# Patient Record
Sex: FEMALE | Race: WHITE | NOT HISPANIC OR LATINO
[De-identification: names, ages, dates, MRNs, and addresses within clinical notes are randomized per-mention and may not be internally consistent; named-entity substitution may affect disease eponyms.]

---

## 2019-01-04 ENCOUNTER — RECORD ABSTRACTING (OUTPATIENT)
Age: 64
End: 2019-01-04

## 2019-01-04 DIAGNOSIS — Z86.39 PERSONAL HISTORY OF OTHER ENDOCRINE, NUTRITIONAL AND METABOLIC DISEASE: ICD-10-CM

## 2019-01-04 DIAGNOSIS — I10 ESSENTIAL (PRIMARY) HYPERTENSION: ICD-10-CM

## 2019-01-04 DIAGNOSIS — Z87.19 PERSONAL HISTORY OF OTHER DISEASES OF THE DIGESTIVE SYSTEM: ICD-10-CM

## 2019-01-04 PROBLEM — Z00.00 ENCOUNTER FOR PREVENTIVE HEALTH EXAMINATION: Status: ACTIVE | Noted: 2019-01-04

## 2019-01-04 RX ORDER — PANTOPRAZOLE 40 MG/1
40 TABLET, DELAYED RELEASE ORAL TWICE DAILY
Refills: 0 | Status: ACTIVE | COMMUNITY

## 2019-01-30 ENCOUNTER — APPOINTMENT (OUTPATIENT)
Dept: INTERNAL MEDICINE | Facility: CLINIC | Age: 64
End: 2019-01-30

## 2019-02-26 ENCOUNTER — APPOINTMENT (OUTPATIENT)
Dept: ENDOCRINOLOGY | Facility: CLINIC | Age: 64
End: 2019-02-26
Payer: MEDICARE

## 2019-02-26 VITALS
OXYGEN SATURATION: 95 % | BODY MASS INDEX: 26.6 KG/M2 | HEIGHT: 63.5 IN | DIASTOLIC BLOOD PRESSURE: 80 MMHG | SYSTOLIC BLOOD PRESSURE: 120 MMHG | WEIGHT: 152 LBS | RESPIRATION RATE: 16 BRPM | HEART RATE: 81 BPM

## 2019-02-26 DIAGNOSIS — Z60.2 PROBLEMS RELATED TO LIVING ALONE: ICD-10-CM

## 2019-02-26 DIAGNOSIS — Z80.3 FAMILY HISTORY OF MALIGNANT NEOPLASM OF BREAST: ICD-10-CM

## 2019-02-26 DIAGNOSIS — Z80.1 FAMILY HISTORY OF MALIGNANT NEOPLASM OF TRACHEA, BRONCHUS AND LUNG: ICD-10-CM

## 2019-02-26 DIAGNOSIS — Z83.3 FAMILY HISTORY OF DIABETES MELLITUS: ICD-10-CM

## 2019-02-26 LAB — GLUCOSE BLDC GLUCOMTR-MCNC: 108

## 2019-02-26 PROCEDURE — 82962 GLUCOSE BLOOD TEST: CPT

## 2019-02-26 PROCEDURE — 99214 OFFICE O/P EST MOD 30 MIN: CPT | Mod: 25

## 2019-02-26 SDOH — SOCIAL STABILITY - SOCIAL INSECURITY: PROBLEMS RELATED TO LIVING ALONE: Z60.2

## 2019-02-26 NOTE — ASSESSMENT
[Carbohydrate Consistent Diet] : carbohydrate consistent diet [Hypoglycemia Management] : hypoglycemia management [Diabetes Foot Care] : diabetes foot care [Long Term Vascular Complications] : long term vascular complications of diabetes [Action and use of Insulin] : action and use of short and long-acting insulin [Self Monitoring of Blood Glucose] : self monitoring of blood glucose [Insulin Self-Administration] : insulin self-administration [FreeTextEntry1] : - pt declined retrying metformin at this time b/o fear GI problems\par - might retry tresiba once stomach issues resolve\par - toujeo 70 un hs, trulicity 1.5 qw, farxiga 10mg qd\par - acarbose 50mg with dinner\par - options for insulin pump/ sensor reviewed. different pumps discussed. pt will consider\par - obtain path report from Dr. Jalloh\par - synthroid  112 mcg \par RTC  3 mos, labs prior\par \par

## 2019-02-26 NOTE — HISTORY OF PRESENT ILLNESS
[FreeTextEntry1] : 64 yo female f/u  for management of  DM2 and thyroid nodules.   \par \par s/p total tx (9/25/18). \par feels well. lost 3 lbs\par on  toujeo 74 un hs, trulicity 1.5 qw, farxiga 10mg qd, synthroid 112mcg\par - stopped fiasp b/o "stomach aches"\par \par TSH- 1.3\par a1c- 6.4 <-- 6.6 <-- 8.6 <-- 8.2\par f/amine-265 <--  258\par g/queenie- 1.2\par \par - ca/PTH/D- nl\par \par reports FBS- 120's, p/D- 164-205.no more nocturnal hypo's\par \par FNA (7/20/18)- of LLP 2.6- benign follic nodule (Ramonita II)\par Thyr US (7/11/18)- multiple b/l nodules,incl dominant RMP 2.4, LMP 2.6 + 2.1, LLP 2.6 All are stable\par \par 1mg ONDST- 1.6\par TSH- 0.19, FT4-1.5\par neg tpo/tg ab\par saw Dr Jalloh- s/p benign of Lt and isthmic nodules (no report is avail). \par \par *** stopped cycloset  b/o  stomach cramps, HA, fatigue \par \par Previously intolerant of metformin. \par failed lantus, toujeo, basaglar. tried tresiba\par \par HPI:\par Has been diagnosed with Hashimoto's thyroiditis and subclinical hyperthyroidism in 1998. Has been followed by Janet Hamm and Kandi, but was never placed on antithyroidal medications. \par \par Was also diagnosed with MNG, and underwent a biopsy of her dominant right thyroid nodule by Dr Jalloh, which was benign. \par No history of neck surgery or radiation exposure to the neck area other than radiologic studies.  Family history is negative for thyroid illness. \par Currently, there are no local neck symptoms of anterior neck pain or problems with breathing, speaking, or swallowing.  Patient denies symptoms of hypothyroidism or hyperthyroidism.\par \par Also, with DM2 since 2003. Denies any complications\par recalls most recent a1c in the 8's range\par Lab review: \par \par TSH- 0.25 <-- 0.083\par \par FT4- 1.15\par \par neg tpo/tg antibodies\par \par \par Thyroid US (4/10/18)- large thyroid. Multiple bilateral nodules, including dominant RMP iso complex 2.5x1.5x3.2; LLP hypo 2.6x2.1x2.3; LMP isosolid 2.4x1.1x.2.1, and isthmic solid iso 1.1x0.6x1.2\par \par 24h I-123 thyroid uptake and scan (4/25/18)- "cold nodules in the LLP and thyroid isthmus". Uptake- 15.1%\par \par FNA (11/07/17) of RMP 2.6cm nodule- colloid nodule\par \par

## 2019-02-27 ENCOUNTER — APPOINTMENT (OUTPATIENT)
Dept: INTERNAL MEDICINE | Facility: CLINIC | Age: 64
End: 2019-02-27
Payer: MEDICARE

## 2019-02-27 VITALS
BODY MASS INDEX: 26.58 KG/M2 | RESPIRATION RATE: 16 BRPM | HEIGHT: 63 IN | SYSTOLIC BLOOD PRESSURE: 120 MMHG | WEIGHT: 150 LBS | DIASTOLIC BLOOD PRESSURE: 80 MMHG | HEART RATE: 85 BPM

## 2019-02-27 PROCEDURE — 99214 OFFICE O/P EST MOD 30 MIN: CPT

## 2019-02-27 RX ORDER — PROPRANOLOL HYDROCHLORIDE 120 MG/1
120 CAPSULE, EXTENDED RELEASE ORAL
Refills: 0 | Status: ACTIVE | COMMUNITY

## 2019-02-27 NOTE — PHYSICAL EXAM
[General Appearance - Alert] : alert [General Appearance - In No Acute Distress] : in no acute distress [Sclera] : the sclera and conjunctiva were normal [PERRL With Normal Accommodation] : pupils were equal in size, round, and reactive to light [Extraocular Movements] : extraocular movements were intact [Outer Ear] : the ears and nose were normal in appearance [Oropharynx] : the oropharynx was normal [Neck Appearance] : the appearance of the neck was normal [Neck Cervical Mass (___cm)] : no neck mass was observed [Jugular Venous Distention Increased] : there was no jugular-venous distention [Thyroid Diffuse Enlargement] : the thyroid was not enlarged [Thyroid Nodule] : there were no palpable thyroid nodules [Auscultation Breath Sounds / Voice Sounds] : lungs were clear to auscultation bilaterally [Heart Rate And Rhythm] : heart rate was normal and rhythm regular [Heart Sounds] : normal S1 and S2 [Heart Sounds Gallop] : no gallops [Murmurs] : no murmurs [Heart Sounds Pericardial Friction Rub] : no pericardial rub [Breast Appearance] : normal in appearance [Breast Palpation Mass] : no palpable masses [Bowel Sounds] : normal bowel sounds [Abdomen Soft] : soft [Abdomen Tenderness] : non-tender [Abdomen Mass (___ Cm)] : no abdominal mass palpated [Cervical Lymph Nodes Enlarged Posterior Bilaterally] : posterior cervical [Cervical Lymph Nodes Enlarged Anterior Bilaterally] : anterior cervical [Supraclavicular Lymph Nodes Enlarged Bilaterally] : supraclavicular [Axillary Lymph Nodes Enlarged Bilaterally] : axillary [Femoral Lymph Nodes Enlarged Bilaterally] : femoral [Inguinal Lymph Nodes Enlarged Bilaterally] : inguinal [No CVA Tenderness] : no ~M costovertebral angle tenderness [No Spinal Tenderness] : no spinal tenderness [Abnormal Walk] : normal gait [Nail Clubbing] : no clubbing  or cyanosis of the fingernails [Musculoskeletal - Swelling] : no joint swelling seen [Motor Tone] : muscle strength and tone were normal [Skin Color & Pigmentation] : normal skin color and pigmentation [Skin Turgor] : normal skin turgor [] : no rash [Oriented To Time, Place, And Person] : oriented to person, place, and time [Impaired Insight] : insight and judgment were intact [Affect] : the affect was normal

## 2019-04-07 ENCOUNTER — APPOINTMENT (OUTPATIENT)
Dept: INTERNAL MEDICINE | Facility: AMBULATORY MEDICAL SERVICES | Age: 64
End: 2019-04-07

## 2019-04-07 ENCOUNTER — APPOINTMENT (OUTPATIENT)
Dept: INTERNAL MEDICINE | Facility: AMBULATORY MEDICAL SERVICES | Age: 64
End: 2019-04-07
Payer: MEDICARE

## 2019-04-07 PROCEDURE — 43239 EGD BIOPSY SINGLE/MULTIPLE: CPT

## 2019-04-18 DIAGNOSIS — R10.9 UNSPECIFIED ABDOMINAL PAIN: ICD-10-CM

## 2019-05-17 ENCOUNTER — APPOINTMENT (OUTPATIENT)
Dept: INTERNAL MEDICINE | Facility: AMBULATORY MEDICAL SERVICES | Age: 64
End: 2019-05-17
Payer: MEDICARE

## 2019-05-17 PROCEDURE — G0121 COLON CA SCRN NOT HI RSK IND: CPT

## 2019-05-29 ENCOUNTER — APPOINTMENT (OUTPATIENT)
Dept: ENDOCRINOLOGY | Facility: CLINIC | Age: 64
End: 2019-05-29
Payer: MEDICARE

## 2019-05-29 VITALS
RESPIRATION RATE: 16 BRPM | BODY MASS INDEX: 26.93 KG/M2 | SYSTOLIC BLOOD PRESSURE: 110 MMHG | WEIGHT: 152 LBS | HEIGHT: 63 IN | DIASTOLIC BLOOD PRESSURE: 60 MMHG | HEART RATE: 76 BPM | OXYGEN SATURATION: 98 %

## 2019-05-29 DIAGNOSIS — R14.0 ABDOMINAL DISTENSION (GASEOUS): ICD-10-CM

## 2019-05-29 LAB — GLUCOSE BLDC GLUCOMTR-MCNC: 138

## 2019-05-29 PROCEDURE — 95251 CONT GLUC MNTR ANALYSIS I&R: CPT

## 2019-05-29 PROCEDURE — 95250 CONT GLUC MNTR PHYS/QHP EQP: CPT

## 2019-05-29 PROCEDURE — 99214 OFFICE O/P EST MOD 30 MIN: CPT

## 2019-05-29 NOTE — ASSESSMENT
[Carbohydrate Consistent Diet] : carbohydrate consistent diet [Hypoglycemia Management] : hypoglycemia management [Diabetes Foot Care] : diabetes foot care [Long Term Vascular Complications] : long term vascular complications of diabetes [Action and use of Insulin] : action and use of short and long-acting insulin [Self Monitoring of Blood Glucose] : self monitoring of blood glucose [Insulin Self-Administration] : insulin self-administration [FreeTextEntry1] : - pt declined retrying metformin at this time b/o fear GI problems\par - might retry tresiba once stomach issues resolve\par - toujeo 70 un hs, trulicity 1.5 qw, farxiga 10mg qd\par - resume acarbose 50mg with dinner\par - refer for GES, and if (+), will stop trulicity. CT abdomen\par - options for insulin pump/ sensor reviewed. different pumps discussed. pt will consider\par - obtain path report from Dr. Jalloh\par - synthroid  112 mcg \par - called for recent labs from PCP\par - jenna PRO\par RTC  3 weeks

## 2019-05-29 NOTE — HISTORY OF PRESENT ILLNESS
[FreeTextEntry1] : 62 yo female f/u  for management of  DM2 and thyroid nodules.   \par \par s/p total tx (9/25/18). \par feels well. lost 3 lbs\par on  toujeo 70 un hs, trulicity 1.5 qw, farxiga 10mg qd,  acarbose 50mg with dinner (freq forgets to take it),  synthroid 112mcg\par - stopped fiasp b/o "stomach aches"\par off HCTZ b/o elevated calcium and noticed that BS is higher since then\par saw Dr. Crocker, had an EGD. still with bloating/ diarrhea. Per pt, symptoms started way prior to trulicity\par recalls 2 GES several yrs ago with equivocal results\par \par TSH- 1.3\par a1c- 6.4 <-- 6.6 <-- 8.6 <-- 8.2\par f/amine-265 <--  258\par g/queenie- 1.2\par \par - ca/PTH/D- nl\par reports FBS-  138- 202 , p/D- 150's- 160's. no more nocturnal hypo's\par \par FNA (7/20/18)- of LLP 2.6- benign follic nodule (Ramonita II)\par Thyr US (7/11/18)- multiple b/l nodules,incl dominant RMP 2.4, LMP 2.6 + 2.1, LLP 2.6.  All are stable\par \par 1mg ONDST- 1.6\par TSH- 0.19, FT4-1.5\par neg tpo/tg ab\par saw Dr Jalloh- s/p benign of Lt and isthmic nodules (no report is avail). \par \par *** stopped cycloset  b/o  stomach cramps, HA, fatigue \par \par Previously intolerant of metformin. \par failed lantus, toujeo, basaglar. tried tresiba\par \par HPI:\par Has been diagnosed with Hashimoto's thyroiditis and subclinical hyperthyroidism in 1998. Has been followed by Janet Hamm and [facility], but was never placed on antithyroidal medications. \par \par Was also diagnosed with MNG, and underwent a biopsy of her dominant right thyroid nodule by Dr Jalloh, which was benign. \par No history of neck surgery or radiation exposure to the neck area other than radiologic studies.  Family history is negative for thyroid illness. \par Currently, there are no local neck symptoms of anterior neck pain or problems with breathing, speaking, or swallowing.  Patient denies symptoms of hypothyroidism or hyperthyroidism.\par \par Also, with DM2 since 2003. Denies any complications\par recalls most recent a1c in the 8's range\par Lab review: \par \par TSH- 0.25 <-- 0.083\par \par FT4- 1.15\par \par neg tpo/tg antibodies\par \par \par Thyroid US (4/10/18)- large thyroid. Multiple bilateral nodules, including dominant RMP iso complex 2.5x1.5x3.2; LLP hypo 2.6x2.1x2.3; LMP isosolid 2.4x1.1x.2.1, and isthmic solid iso 1.1x0.6x1.2\par \par 24h I-123 thyroid uptake and scan (4/25/18)- "cold nodules in the LLP and thyroid isthmus". Uptake- 15.1%\par \par FNA (11/07/17) of RMP 2.6cm nodule- colloid nodule\par \par

## 2019-06-12 ENCOUNTER — APPOINTMENT (OUTPATIENT)
Dept: ENDOCRINOLOGY | Facility: CLINIC | Age: 64
End: 2019-06-12
Payer: MEDICARE

## 2019-06-12 VITALS
DIASTOLIC BLOOD PRESSURE: 70 MMHG | OXYGEN SATURATION: 98 % | HEIGHT: 63 IN | SYSTOLIC BLOOD PRESSURE: 135 MMHG | RESPIRATION RATE: 16 BRPM | HEART RATE: 78 BPM | BODY MASS INDEX: 27.11 KG/M2 | WEIGHT: 153 LBS

## 2019-06-12 PROCEDURE — G0108 DIAB MANAGE TRN  PER INDIV: CPT

## 2019-06-18 ENCOUNTER — APPOINTMENT (OUTPATIENT)
Dept: CT IMAGING | Facility: CLINIC | Age: 64
End: 2019-06-18

## 2019-06-24 ENCOUNTER — APPOINTMENT (OUTPATIENT)
Dept: ENDOCRINOLOGY | Facility: CLINIC | Age: 64
End: 2019-06-24

## 2019-06-27 ENCOUNTER — APPOINTMENT (OUTPATIENT)
Dept: NUCLEAR MEDICINE | Facility: HOSPITAL | Age: 64
End: 2019-06-27

## 2019-08-13 ENCOUNTER — APPOINTMENT (OUTPATIENT)
Dept: ENDOCRINOLOGY | Facility: CLINIC | Age: 64
End: 2019-08-13
Payer: MEDICARE

## 2019-08-13 VITALS
HEART RATE: 83 BPM | WEIGHT: 150 LBS | BODY MASS INDEX: 26.58 KG/M2 | OXYGEN SATURATION: 98 % | SYSTOLIC BLOOD PRESSURE: 110 MMHG | RESPIRATION RATE: 16 BRPM | DIASTOLIC BLOOD PRESSURE: 65 MMHG | HEIGHT: 63 IN

## 2019-08-13 LAB — GLUCOSE BLDC GLUCOMTR-MCNC: 143

## 2019-08-13 PROCEDURE — 82962 GLUCOSE BLOOD TEST: CPT

## 2019-08-13 PROCEDURE — 99214 OFFICE O/P EST MOD 30 MIN: CPT | Mod: 25

## 2019-08-13 NOTE — HISTORY OF PRESENT ILLNESS
[FreeTextEntry1] : 63 yo female f/u  for management of  DM2 and thyroid nodules.   \par \par *** Aug 13, 2019 ***\par \par on  toujeo 70 un hs, trulicity 1.5 qw, farxiga 10mg qd,  synthroid  112 mcg, ramipril\par off HCTZ\par stopped crestor b/o myalgia. was rx'ed zetia\par off acarbose b/o GI issues\par did not do GES yet\par had an MRI abdomen done- reports normal results\par a1c- 6.3, f/am-272\par TSH- 1.09, FT4- 1.3, LDL- 122, TG- 289\par ca- 10.5\par no log, reports FBS- low 110's, ppg- upto 149\par \par *** May 29, 2019 ***\par \par s/p total tx (9/25/18). \par feels well. lost 3 lbs\par on  toujeo 70 un hs, trulicity 1.5 qw, farxiga 10mg qd,  acarbose 50mg with dinner (freq forgets to take it),  synthroid 112mcg\par - stopped fiasp b/o "stomach aches"\par off HCTZ b/o elevated calcium and noticed that BS is higher since then\par saw Dr. Crocker, had an EGD. still with bloating/ diarrhea. Per pt, symptoms started way prior to trulicity\par recalls 2 GES several yrs ago with equivocal results\par \par TSH- 1.3\par a1c- 6.4 <-- 6.6 <-- 8.6 <-- 8.2\par f/amine-265 <--  258\par g/queenie- 1.2\par \par - ca/PTH/D- nl\par reports FBS-  138- 202 , p/D- 150's- 160's. no more nocturnal hypo's\par \par FNA (7/20/18)- of LLP 2.6- benign follic nodule (Ramonita II)\par Thyr US (7/11/18)- multiple b/l nodules,incl dominant RMP 2.4, LMP 2.6 + 2.1, LLP 2.6.  All are stable\par \par 1mg ONDST- 1.6\par TSH- 0.19, FT4-1.5\par neg tpo/tg ab\par saw Dr Jalloh- s/p benign of Lt and isthmic nodules (no report is avail). \par \par *** stopped cycloset  b/o  stomach cramps, HA, fatigue \par \par Previously intolerant of metformin. \par failed lantus, toujeo, basaglar. tried tresiba\par \par HPI:\par Has been diagnosed with Hashimoto's thyroiditis and subclinical hyperthyroidism in 1998. Has been followed by Janet Hamm and Kandi, but was never placed on antithyroidal medications. \par \par Was also diagnosed with MNG, and underwent a biopsy of her dominant right thyroid nodule by Dr Jalloh, which was benign. \par No history of neck surgery or radiation exposure to the neck area other than radiologic studies.  Family history is negative for thyroid illness. \par Currently, there are no local neck symptoms of anterior neck pain or problems with breathing, speaking, or swallowing.  Patient denies symptoms of hypothyroidism or hyperthyroidism.\par \par Also, with DM2 since 2003. Denies any complications\par recalls most recent a1c in the 8's range\par Lab review: \par \par TSH- 0.25 <-- 0.083\par \par FT4- 1.15\par \par neg tpo/tg antibodies\par \par \par Thyroid US (4/10/18)- large thyroid. Multiple bilateral nodules, including dominant RMP iso complex 2.5x1.5x3.2; LLP hypo 2.6x2.1x2.3; LMP isosolid 2.4x1.1x.2.1, and isthmic solid iso 1.1x0.6x1.2\par \par 24h I-123 thyroid uptake and scan (4/25/18)- "cold nodules in the LLP and thyroid isthmus". Uptake- 15.1%\par \par FNA (11/07/17) of RMP 2.6cm nodule- colloid nodule\par \par

## 2019-09-05 ENCOUNTER — APPOINTMENT (OUTPATIENT)
Dept: INTERNAL MEDICINE | Facility: CLINIC | Age: 64
End: 2019-09-05
Payer: MEDICARE

## 2019-09-05 VITALS
HEIGHT: 63 IN | HEART RATE: 84 BPM | WEIGHT: 150 LBS | OXYGEN SATURATION: 96 % | SYSTOLIC BLOOD PRESSURE: 150 MMHG | DIASTOLIC BLOOD PRESSURE: 61 MMHG | BODY MASS INDEX: 26.58 KG/M2

## 2019-09-05 VITALS — DIASTOLIC BLOOD PRESSURE: 82 MMHG | SYSTOLIC BLOOD PRESSURE: 132 MMHG

## 2019-09-05 PROCEDURE — 99214 OFFICE O/P EST MOD 30 MIN: CPT

## 2019-09-05 RX ORDER — SODIUM PICOSULFATE, MAGNESIUM OXIDE, AND ANHYDROUS CITRIC ACID 10; 3.5; 12 MG/160ML; G/160ML; G/160ML
10-3.5-12 MG-GM LIQUID ORAL
Qty: 1 | Refills: 0 | Status: DISCONTINUED | COMMUNITY
Start: 2019-04-18 | End: 2019-09-05

## 2019-09-05 RX ORDER — PANCRELIPASE 36000; 180000; 114000 [USP'U]/1; [USP'U]/1; [USP'U]/1
36000-114000 CAPSULE, DELAYED RELEASE PELLETS ORAL
Refills: 0 | Status: DISCONTINUED | COMMUNITY
End: 2019-09-05

## 2019-09-05 RX ORDER — ONDANSETRON 4 MG/1
4 TABLET ORAL 4 TIMES DAILY
Qty: 8 | Refills: 0 | Status: DISCONTINUED | COMMUNITY
Start: 2019-04-18 | End: 2019-09-05

## 2019-09-05 RX ORDER — ACARBOSE 50 MG/1
50 TABLET ORAL
Qty: 90 | Refills: 2 | Status: DISCONTINUED | COMMUNITY
Start: 2019-02-26 | End: 2019-09-05

## 2019-09-05 NOTE — HISTORY OF PRESENT ILLNESS
[FreeTextEntry1] : She still has diarrhea and pain in middle of abdomen this happens on/off 2-3 times a week, mucousy. She feels full quickly. Over last week pain right upper abdomen and flank. Constant. Dr.Abalev carrascoed  gastric emptying study. Diabetes is under control last A1c 6.6

## 2019-09-05 NOTE — PHYSICAL EXAM
[General Appearance - In No Acute Distress] : in no acute distress [General Appearance - Alert] : alert [Sclera] : the sclera and conjunctiva were normal [PERRL With Normal Accommodation] : pupils were equal in size, round, and reactive to light [Outer Ear] : the ears and nose were normal in appearance [Extraocular Movements] : extraocular movements were intact [Oropharynx] : the oropharynx was normal [Neck Cervical Mass (___cm)] : no neck mass was observed [Neck Appearance] : the appearance of the neck was normal [Jugular Venous Distention Increased] : there was no jugular-venous distention [Thyroid Diffuse Enlargement] : the thyroid was not enlarged [Thyroid Nodule] : there were no palpable thyroid nodules [Heart Rate And Rhythm] : heart rate was normal and rhythm regular [Auscultation Breath Sounds / Voice Sounds] : lungs were clear to auscultation bilaterally [Heart Sounds] : normal S1 and S2 [Murmurs] : no murmurs [Heart Sounds Gallop] : no gallops [Full Pulse] : the pedal pulses are present [Heart Sounds Pericardial Friction Rub] : no pericardial rub [Edema] : there was no peripheral edema [Bowel Sounds] : normal bowel sounds [Abdomen Soft] : soft [Abdomen Tenderness] : non-tender [Abdomen Mass (___ Cm)] : no abdominal mass palpated [Cervical Lymph Nodes Enlarged Posterior Bilaterally] : posterior cervical [Cervical Lymph Nodes Enlarged Anterior Bilaterally] : anterior cervical [Axillary Lymph Nodes Enlarged Bilaterally] : axillary [Femoral Lymph Nodes Enlarged Bilaterally] : femoral [Supraclavicular Lymph Nodes Enlarged Bilaterally] : supraclavicular [Inguinal Lymph Nodes Enlarged Bilaterally] : inguinal [No CVA Tenderness] : no ~M costovertebral angle tenderness [No Spinal Tenderness] : no spinal tenderness [Nail Clubbing] : no clubbing  or cyanosis of the fingernails [Abnormal Walk] : normal gait [Musculoskeletal - Swelling] : no joint swelling seen [Motor Tone] : muscle strength and tone were normal [Skin Color & Pigmentation] : normal skin color and pigmentation [Skin Turgor] : normal skin turgor [] : no rash [Sensation] : the sensory exam was normal to light touch and pinprick [Deep Tendon Reflexes (DTR)] : deep tendon reflexes were 2+ and symmetric [No Focal Deficits] : no focal deficits [Oriented To Time, Place, And Person] : oriented to person, place, and time [Affect] : the affect was normal [Impaired Insight] : insight and judgment were intact

## 2019-09-17 RX ORDER — PEN NEEDLE, DIABETIC 29 G X1/2"
32G X 4 MM NEEDLE, DISPOSABLE MISCELLANEOUS
Qty: 1 | Refills: 2 | Status: ACTIVE | COMMUNITY
Start: 2019-09-17 | End: 1900-01-01

## 2019-09-24 ENCOUNTER — APPOINTMENT (OUTPATIENT)
Dept: INTERNAL MEDICINE | Facility: CLINIC | Age: 64
End: 2019-09-24

## 2019-10-02 PROBLEM — Z60.2 PERSON LIVING ALONE: Status: ACTIVE | Noted: 2019-02-26

## 2019-11-13 ENCOUNTER — TRANSCRIPTION ENCOUNTER (OUTPATIENT)
Age: 64
End: 2019-11-13

## 2019-11-13 ENCOUNTER — APPOINTMENT (OUTPATIENT)
Dept: ENDOCRINOLOGY | Facility: CLINIC | Age: 64
End: 2019-11-13
Payer: MEDICARE

## 2019-11-13 VITALS — TEMPERATURE: 98.6 F | BODY MASS INDEX: 26.4 KG/M2 | HEIGHT: 63 IN | WEIGHT: 149 LBS

## 2019-11-13 LAB
CHOLEST SERPL-MCNC: 184 MG/DL
CHOLEST/HDLC SERPL: 4.8 RATIO
GLUCOSE BLDC GLUCOMTR-MCNC: 124
HDLC SERPL-MCNC: 38 MG/DL
LDLC SERPL CALC-MCNC: 112 MG/DL
TRIGL SERPL-MCNC: 170 MG/DL

## 2019-11-13 PROCEDURE — 36415 COLL VENOUS BLD VENIPUNCTURE: CPT

## 2019-11-13 PROCEDURE — 82962 GLUCOSE BLOOD TEST: CPT

## 2019-11-13 PROCEDURE — 99214 OFFICE O/P EST MOD 30 MIN: CPT | Mod: 25

## 2019-11-13 NOTE — HISTORY OF PRESENT ILLNESS
[FreeTextEntry1] : 65 yo female f/u  for management of  DM2 and thyroid nodules.   \par \par *** Nov 13, 2019 ***\par recovering from cold. still on levaquin\par on toujeo 70 un hs, trulicity 1.5 qw, farxiga 10mg qd, synthroid 112 mcg, crestor 5mg qw (feels fine on this dose) \par not taking zetia\par no GES yet\par a1c- 6.7, iCa- 5.6, TSH- 1.9, ca- 10.0\par no Lipid panel done\par \par *** Aug 13, 2019 ***\par \par on  toujeo 70 un hs, trulicity 1.5 qw, farxiga 10mg qd,  synthroid  112 mcg, ramipril\par off HCTZ\par stopped crestor b/o myalgia. was rx'ed zetia\par off acarbose b/o GI issues\par did not do GES yet\par had an MRI abdomen done- reports normal results\par a1c- 6.3, f/am-272\par TSH- 1.09, FT4- 1.3, LDL- 122, TG- 289\par ca- 10.5\par no log, reports FBS- low 110's, ppg- upto 149\par \par *** May 29, 2019 ***\par \par s/p total tx (9/25/18). \par feels well. lost 3 lbs\par on  toujeo 70 un hs, trulicity 1.5 qw, farxiga 10mg qd,  acarbose 50mg with dinner (freq forgets to take it),  synthroid 112mcg\par - stopped fiasp b/o "stomach aches"\par off HCTZ b/o elevated calcium and noticed that BS is higher since then\par saw Dr. Crocker, had an EGD. still with bloating/ diarrhea. Per pt, symptoms started way prior to trulicity\par recalls 2 GES several yrs ago with equivocal results\par \par TSH- 1.3\par a1c- 6.4 <-- 6.6 <-- 8.6 <-- 8.2\par f/amine-265 <--  258\par g/queenie- 1.2\par \par - ca/PTH/D- nl\par reports FBS-  138- 202 , p/D- 150's- 160's. no more nocturnal hypo's\par \par FNA (7/20/18)- of LLP 2.6- benign follic nodule (Ramonita II)\par Thyr US (7/11/18)- multiple b/l nodules,incl dominant RMP 2.4, LMP 2.6 + 2.1, LLP 2.6.  All are stable\par \par 1mg ONDST- 1.6\par TSH- 0.19, FT4-1.5\par neg tpo/tg ab\par saw Dr Jalloh- s/p benign of Lt and isthmic nodules (no report is avail). \par \par *** stopped cycloset  b/o  stomach cramps, HA, fatigue \par \par Previously intolerant of metformin. \par failed lantus, toujeo, basaglar. tried tresiba\par \par HPI:\par Has been diagnosed with Hashimoto's thyroiditis and subclinical hyperthyroidism in 1998. Has been followed by Drs Noris and Kandi, but was never placed on antithyroidal medications. \par \par Was also diagnosed with MNG, and underwent a biopsy of her dominant right thyroid nodule by Dr Jalloh, which was benign. \par No history of neck surgery or radiation exposure to the neck area other than radiologic studies.  Family history is negative for thyroid illness. \par Currently, there are no local neck symptoms of anterior neck pain or problems with breathing, speaking, or swallowing.  Patient denies symptoms of hypothyroidism or hyperthyroidism.\par \par Also, with DM2 since 2003. Denies any complications\par recalls most recent a1c in the 8's range\par Lab review: \par \par TSH- 0.25 <-- 0.083\par \par FT4- 1.15\par \par neg tpo/tg antibodies\par \par \par Thyroid US (4/10/18)- large thyroid. Multiple bilateral nodules, including dominant RMP iso complex 2.5x1.5x3.2; LLP hypo 2.6x2.1x2.3; LMP isosolid 2.4x1.1x.2.1, and isthmic solid iso 1.1x0.6x1.2\par \par 24h I-123 thyroid uptake and scan (4/25/18)- "cold nodules in the LLP and thyroid isthmus". Uptake- 15.1%\par \par FNA (11/07/17) of RMP 2.6cm nodule- colloid nodule\par \par

## 2019-12-12 ENCOUNTER — APPOINTMENT (OUTPATIENT)
Dept: INTERNAL MEDICINE | Facility: CLINIC | Age: 64
End: 2019-12-12

## 2019-12-18 ENCOUNTER — APPOINTMENT (OUTPATIENT)
Dept: INTERNAL MEDICINE | Facility: CLINIC | Age: 64
End: 2019-12-18

## 2020-02-11 ENCOUNTER — LABORATORY RESULT (OUTPATIENT)
Age: 65
End: 2020-02-11

## 2020-02-11 ENCOUNTER — APPOINTMENT (OUTPATIENT)
Dept: ENDOCRINOLOGY | Facility: CLINIC | Age: 65
End: 2020-02-11
Payer: MEDICARE

## 2020-02-11 VITALS
BODY MASS INDEX: 27.82 KG/M2 | DIASTOLIC BLOOD PRESSURE: 60 MMHG | HEIGHT: 63 IN | OXYGEN SATURATION: 96 % | SYSTOLIC BLOOD PRESSURE: 110 MMHG | RESPIRATION RATE: 17 BRPM | WEIGHT: 157 LBS | HEART RATE: 87 BPM

## 2020-02-11 LAB — GLUCOSE BLDC GLUCOMTR-MCNC: 188

## 2020-02-11 PROCEDURE — 95250 CONT GLUC MNTR PHYS/QHP EQP: CPT

## 2020-02-11 PROCEDURE — 36415 COLL VENOUS BLD VENIPUNCTURE: CPT

## 2020-02-11 PROCEDURE — 95251 CONT GLUC MNTR ANALYSIS I&R: CPT

## 2020-02-11 PROCEDURE — 99214 OFFICE O/P EST MOD 30 MIN: CPT | Mod: 25

## 2020-02-11 NOTE — ASSESSMENT
[Carbohydrate Consistent Diet] : carbohydrate consistent diet [Diabetes Foot Care] : diabetes foot care [Hypoglycemia Management] : hypoglycemia management [Long Term Vascular Complications] : long term vascular complications of diabetes [Action and use of Insulin] : action and use of short and long-acting insulin [Self Monitoring of Blood Glucose] : self monitoring of blood glucose [Insulin Self-Administration] : insulin self-administration [FreeTextEntry1] : - pt declined retrying metformin at this time b/o fear GI problems\par - might retry tresiba once stomach issues resolve\par - a1c is a bit lower according to the reported FS- suspect some hypos\par - Naomie PRO\par - toujeo 70 un hs, trulicity 1.5 qw, farxiga 10mg qd- will adjust post sensor download\par - keep off acarbose\par - refer for GES, and if (+), will stop trulicity. obtain the report of abd MRI\par - options for insulin pump/ sensor reviewed. different pumps discussed. pt will consider\par - obtain path report from Dr. Jalloh\par - synthroid  112 mcg \par - monitor ca/PTH\par - rpt fasting lipid panel- with direct LDL- can ry crestor 5mg biw, and if tolerates will slowly uptitrate. might require combo with zetia +/- adding fenofibrate\par - f/u pulmonary (Dr. Lang)\par RTC  3 mos

## 2020-02-11 NOTE — HISTORY OF PRESENT ILLNESS
[FreeTextEntry1] : 65 yo female f/u  for management of  DM2 and thyroid nodules.   \par \par *** Feb 11, 2020 ***\par \par on toujeo 70 un hs, trulicity 1.5 qw, farxiga 10mg qd\par not taking crestor b/o generalized myalgia\par TSH- 1.14\par TG-419\par a1c- 6.4\par \par no log, reports fbs- 140's, ppg- 160's\par today fbs in the office- 188\par denies hypo's\par \par \par last cardio - 2019 (Dr Stevens)\par last ST- 2019\par last echo- 2019\par last podiatry - several years ago\par last ophthalmology- 02/20\par \par *** Nov 13, 2019 ***\par recovering from cold. still on levaquin\par on toujeo 70 un hs, trulicity 1.5 qw, farxiga 10mg qd, synthroid 112 mcg, crestor 5mg qw (feels fine on this dose) \par not taking zetia\par no GES yet\par a1c- 6.7, iCa- 5.6, TSH- 1.9, ca- 10.0\par no Lipid panel done\par \par *** Aug 13, 2019 ***\par \par on  toujeo 70 un hs, trulicity 1.5 qw, farxiga 10mg qd,  synthroid  112 mcg, ramipril\par off HCTZ\par stopped crestor b/o myalgia. was rx'ed zetia\par off acarbose b/o GI issues\par did not do GES yet\par had an MRI abdomen done- reports normal results\par a1c- 6.3, f/am-272\par TSH- 1.09, FT4- 1.3, LDL- 122, TG- 289\par ca- 10.5\par no log, reports FBS- low 110's, ppg- upto 149\par \par *** May 29, 2019 ***\par \par s/p total tx (9/25/18). \par feels well. lost 3 lbs\par on  toujeo 70 un hs, trulicity 1.5 qw, farxiga 10mg qd,  acarbose 50mg with dinner (freq forgets to take it),  synthroid 112mcg\par - stopped fiasp b/o "stomach aches"\par off HCTZ b/o elevated calcium and noticed that BS is higher since then\par saw Dr. Crocker, had an EGD. still with bloating/ diarrhea. Per pt, symptoms started way prior to trulicity\par recalls 2 GES several yrs ago with equivocal results\par \par TSH- 1.3\par a1c- 6.4 <-- 6.6 <-- 8.6 <-- 8.2\par f/amine-265 <--  258\par g/queenie- 1.2\par \par - ca/PTH/D- nl\par reports FBS-  138- 202 , p/D- 150's- 160's. no more nocturnal hypo's\par \par FNA (7/20/18)- of LLP 2.6- benign follic nodule (Ramonita II)\par Thyr US (7/11/18)- multiple b/l nodules,incl dominant RMP 2.4, LMP 2.6 + 2.1, LLP 2.6.  All are stable\par \par 1mg ONDST- 1.6\par TSH- 0.19, FT4-1.5\par neg tpo/tg ab\par saw Dr Jalloh- s/p benign of Lt and isthmic nodules (no report is avail). \par \par *** stopped cycloset  b/o  stomach cramps, HA, fatigue \par \par Previously intolerant of metformin. \par failed lantus, toujeo, basaglar. tried tresiba\par \par HPI:\par Has been diagnosed with Hashimoto's thyroiditis and subclinical hyperthyroidism in 1998. Has been followed by Janet Hamm and [facility], but was never placed on antithyroidal medications. \par \par Was also diagnosed with MNG, and underwent a biopsy of her dominant right thyroid nodule by Dr Jalloh, which was benign. \par No history of neck surgery or radiation exposure to the neck area other than radiologic studies.  Family history is negative for thyroid illness. \par Currently, there are no local neck symptoms of anterior neck pain or problems with breathing, speaking, or swallowing.  Patient denies symptoms of hypothyroidism or hyperthyroidism.\par \par Also, with DM2 since 2003. Denies any complications\par recalls most recent a1c in the 8's range\par Lab review: \par \par TSH- 0.25 <-- 0.083\par \par FT4- 1.15\par \par neg tpo/tg antibodies\par \par \par Thyroid US (4/10/18)- large thyroid. Multiple bilateral nodules, including dominant RMP iso complex 2.5x1.5x3.2; LLP hypo 2.6x2.1x2.3; LMP isosolid 2.4x1.1x.2.1, and isthmic solid iso 1.1x0.6x1.2\par \par 24h I-123 thyroid uptake and scan (4/25/18)- "cold nodules in the LLP and thyroid isthmus". Uptake- 15.1%\par \par FNA (11/07/17) of RMP 2.6cm nodule- colloid nodule\par \par

## 2020-02-12 LAB
CA-I SERPL-SCNC: 1.33 MMOL/L
CALCIUM SERPL-MCNC: 10.4 MG/DL
CALCIUM SERPL-MCNC: 10.4 MG/DL
CHOLEST SERPL-MCNC: 212 MG/DL
CHOLEST/HDLC SERPL: 6.9 RATIO
HDLC SERPL-MCNC: 31 MG/DL
LDLC SERPL CALC-MCNC: NORMAL MG/DL
PARATHYROID HORMONE INTACT: 90 PG/ML
TRIGL SERPL-MCNC: 550 MG/DL

## 2020-05-18 ENCOUNTER — APPOINTMENT (OUTPATIENT)
Dept: ENDOCRINOLOGY | Facility: CLINIC | Age: 65
End: 2020-05-18
Payer: MEDICARE

## 2020-05-18 VITALS
DIASTOLIC BLOOD PRESSURE: 60 MMHG | RESPIRATION RATE: 17 BRPM | WEIGHT: 150 LBS | BODY MASS INDEX: 26.58 KG/M2 | HEART RATE: 80 BPM | SYSTOLIC BLOOD PRESSURE: 130 MMHG | HEIGHT: 63 IN | OXYGEN SATURATION: 98 %

## 2020-05-18 LAB — GLUCOSE BLDC GLUCOMTR-MCNC: 163

## 2020-05-18 PROCEDURE — 36415 COLL VENOUS BLD VENIPUNCTURE: CPT

## 2020-05-18 PROCEDURE — 99214 OFFICE O/P EST MOD 30 MIN: CPT | Mod: 25

## 2020-05-18 RX ORDER — DIPHENOXYLATE HYDROCHLORIDE AND ATROPINE SULFATE 2.5; .025 MG/1; MG/1
2.5-0.025 TABLET ORAL 4 TIMES DAILY
Refills: 0 | Status: DISCONTINUED | COMMUNITY
End: 2020-05-18

## 2020-05-18 RX ORDER — HYDROCHLOROTHIAZIDE 25 MG/1
25 TABLET ORAL
Refills: 0 | Status: DISCONTINUED | COMMUNITY
End: 2020-05-18

## 2020-05-18 NOTE — HISTORY OF PRESENT ILLNESS
[FreeTextEntry1] : 63 yo female f/u  for management of  DM2 and thyroid nodules.   \par \par *** May 18, 2020 ***\par \par diagnosed with breast cancer. S/p lumpectomy. planned for XRT and hormonal therapy\par On toujeo 70 un hs, trulicity 1.5 qw, farxiga 10mg qd, fenofibrate  134 mg, Synthroid 112 mcg\par not taking crestor\par reports fbs- 120's- 130's, ppg- 160's\par \par *** Feb 11, 2020 ***\par \par on toujeo 70 un hs, trulicity 1.5 qw, farxiga 10mg qd\par not taking crestor b/o generalized myalgia\par TSH- 1.14\par TG-419\par a1c- 6.4\par \par no log, reports fbs- 140's, ppg- 160's\par today fbs in the office- 188\par denies hypo's\par \par \par \par *** Nov 13, 2019 ***\par recovering from cold. still on levaquin\par on toujeo 70 un hs, trulicity 1.5 qw, farxiga 10mg qd, synthroid 112 mcg, crestor 5mg qw (feels fine on this dose) \par not taking zetia\par no GES yet\par a1c- 6.7, iCa- 5.6, TSH- 1.9, ca- 10.0\par no Lipid panel done\par \par *** Aug 13, 2019 ***\par \par on  toujeo 70 un hs, trulicity 1.5 qw, farxiga 10mg qd,  synthroid  112 mcg, ramipril\par off HCTZ\par stopped crestor b/o myalgia. was rx'ed zetia\par off acarbose b/o GI issues\par did not do GES yet\par had an MRI abdomen done- reports normal results\par a1c- 6.3, f/am-272\par TSH- 1.09, FT4- 1.3, LDL- 122, TG- 289\par ca- 10.5\par no log, reports FBS- low 110's, ppg- upto 149\par \par *** May 29, 2019 ***\par \par s/p total tx (9/25/18). \par feels well. lost 3 lbs\par on  toujeo 70 un hs, trulicity 1.5 qw, farxiga 10mg qd,  acarbose 50mg with dinner (freq forgets to take it),  synthroid 112mcg\par - stopped fiasp b/o "stomach aches"\par off HCTZ b/o elevated calcium and noticed that BS is higher since then\par saw Dr. Crocker, had an EGD. still with bloating/ diarrhea. Per pt, symptoms started way prior to trulicity\par recalls 2 GES several yrs ago with equivocal results\par \par TSH- 1.3\par a1c- 6.4 <-- 6.6 <-- 8.6 <-- 8.2\par f/amine-265 <--  258\par g/queenie- 1.2\par \par - ca/PTH/D- nl\par reports FBS-  138- 202 , p/D- 150's- 160's. no more nocturnal hypo's\par \par FNA (7/20/18)- of LLP 2.6- benign follic nodule (Ramonita II)\par Thyr US (7/11/18)- multiple b/l nodules,incl dominant RMP 2.4, LMP 2.6 + 2.1, LLP 2.6.  All are stable\par \par 1mg ONDST- 1.6\par TSH- 0.19, FT4-1.5\par neg tpo/tg ab\par saw Dr Jalloh- s/p benign of Lt and isthmic nodules (no report is avail). \par \par *** stopped cycloset  b/o  stomach cramps, HA, fatigue \par \par Previously intolerant of metformin. \par failed lantus, toujeo, basaglar. tried tresiba\par \par HPI:\par Has been diagnosed with Hashimoto's thyroiditis and subclinical hyperthyroidism in 1998. Has been followed by Janet Hamm and Kandi, but was never placed on antithyroidal medications. \par \par Was also diagnosed with MNG, and underwent a biopsy of her dominant right thyroid nodule by Dr Jalloh, which was benign. \par No history of neck surgery or radiation exposure to the neck area other than radiologic studies.  Family history is negative for thyroid illness. \par Currently, there are no local neck symptoms of anterior neck pain or problems with breathing, speaking, or swallowing.  Patient denies symptoms of hypothyroidism or hyperthyroidism.\par \par Also, with DM2 since 2003. Denies any complications\par recalls most recent a1c in the 8's range\par Lab review: \par \par TSH- 0.25 <-- 0.083\par \par FT4- 1.15\par \par neg tpo/tg antibodies\par \par \par Thyroid US (4/10/18)- large thyroid. Multiple bilateral nodules, including dominant RMP iso complex 2.5x1.5x3.2; LLP hypo 2.6x2.1x2.3; LMP isosolid 2.4x1.1x.2.1, and isthmic solid iso 1.1x0.6x1.2\par \par 24h I-123 thyroid uptake and scan (4/25/18)- "cold nodules in the LLP and thyroid isthmus". Uptake- 15.1%\par \par FNA (11/07/17) of RMP 2.6cm nodule- colloid nodule\par \par \par ********\par \par last cardio - 2019 (Dr Stevens)\par last ST- 2019\par last echo- 2019\par last podiatry - several years ago\par last ophthalmology- 02/20\par

## 2020-05-18 NOTE — REASON FOR VISIT
[Follow - Up] : a follow-up visit [DM Type 2] : DM Type 2 [Thyroid nodule/ MNG] : thyroid nodule/ MNG

## 2020-05-18 NOTE — ASSESSMENT
[Carbohydrate Consistent Diet] : carbohydrate consistent diet [Hypoglycemia Management] : hypoglycemia management [Diabetes Foot Care] : diabetes foot care [Long Term Vascular Complications] : long term vascular complications of diabetes [Action and use of Insulin] : action and use of short and long-acting insulin [Self Monitoring of Blood Glucose] : self monitoring of blood glucose [Insulin Self-Administration] : insulin self-administration [FreeTextEntry1] : - pt declined retrying metformin at this time b/o fear GI problems\par - might retry tresiba once stomach issues resolve\par - a1c is a bit lower according to the reported FS- suspect some hypos\par - toujeo 70 un hs, trulicity 1.5 qw, farxiga 10mg qd- will adjust post sensor download\par - keep off acarbose\par - refer for GES, and if (+), will stop trulicity. obtain the report of abd MRI\par - options for insulin pump/ sensor reviewed. different pumps discussed. pt will consider\par - obtain path report from Dr. Jalloh\par - synthroid  112 mcg \par - monitor ca/PTH\par - rpt fasting lipid panel- with direct LDL- can try crestor 5mg biw, and if tolerates will slowly uptitrate. might require combo with zetia\par - cont  fenofibrate 134 mg qd\par - f/u pulmonary (Dr. Lang)\par RTC  3 mos

## 2020-05-19 ENCOUNTER — TRANSCRIPTION ENCOUNTER (OUTPATIENT)
Age: 65
End: 2020-05-19

## 2020-05-19 LAB
25(OH)D3 SERPL-MCNC: 77.9 NG/ML
ALBUMIN SERPL ELPH-MCNC: 4.3 G/DL
ALP BLD-CCNC: 60 U/L
ALT SERPL-CCNC: 23 U/L
ANION GAP SERPL CALC-SCNC: 12 MMOL/L
AST SERPL-CCNC: 22 U/L
BILIRUB SERPL-MCNC: 0.8 MG/DL
BUN SERPL-MCNC: 18 MG/DL
CALCIUM SERPL-MCNC: 10.1 MG/DL
CHLORIDE SERPL-SCNC: 108 MMOL/L
CHOLEST SERPL-MCNC: 171 MG/DL
CHOLEST/HDLC SERPL: 6 RATIO
CO2 SERPL-SCNC: 25 MMOL/L
CREAT SERPL-MCNC: 0.57 MG/DL
ESTIMATED AVERAGE GLUCOSE: 151 MG/DL
FRUCTOSAMINE SERPL-MCNC: 220 UMOL/L
GLUCOSE SERPL-MCNC: 155 MG/DL
HBA1C MFR BLD HPLC: 6.9 %
HDLC SERPL-MCNC: 28 MG/DL
LDLC SERPL CALC-MCNC: 71 MG/DL
POTASSIUM SERPL-SCNC: 4.1 MMOL/L
PROT SERPL-MCNC: 6.3 G/DL
SODIUM SERPL-SCNC: 145 MMOL/L
T4 FREE SERPL-MCNC: 1.9 NG/DL
TRIGL SERPL-MCNC: 359 MG/DL
TSH SERPL-ACNC: 0.18 UIU/ML
VIT B12 SERPL-MCNC: 566 PG/ML

## 2020-08-18 ENCOUNTER — APPOINTMENT (OUTPATIENT)
Dept: FAMILY MEDICINE | Facility: CLINIC | Age: 65
End: 2020-08-18

## 2020-08-18 ENCOUNTER — APPOINTMENT (OUTPATIENT)
Dept: ENDOCRINOLOGY | Facility: CLINIC | Age: 65
End: 2020-08-18
Payer: MEDICARE

## 2020-08-18 VITALS
RESPIRATION RATE: 16 BRPM | SYSTOLIC BLOOD PRESSURE: 122 MMHG | HEIGHT: 63 IN | OXYGEN SATURATION: 98 % | WEIGHT: 147 LBS | DIASTOLIC BLOOD PRESSURE: 70 MMHG | HEART RATE: 80 BPM | BODY MASS INDEX: 26.05 KG/M2

## 2020-08-18 LAB — GLUCOSE BLDC GLUCOMTR-MCNC: 116

## 2020-08-18 PROCEDURE — 99215 OFFICE O/P EST HI 40 MIN: CPT

## 2020-08-18 PROCEDURE — 82962 GLUCOSE BLOOD TEST: CPT

## 2020-08-18 NOTE — HISTORY OF PRESENT ILLNESS
[FreeTextEntry1] : 63 yo female f/u  for management of  DM2 and thyroid nodules.   \par \par *** Aug 18, 2020 ***\par \par on  toujeo 74 un hs, trulicity 1.5 qw, farxiga 10mg qd, fenofibrate 200mg, synthroid 100 mcg\par feels more tired, poss related to the radiation tx.\par hot flashes on letrozole\par reports fbs- 142-162, not checking ppg\par she stopped white bread and fruits within past 2 weeks- FS are much better\par today fbs- 116\par labs from 8/14/20- a1c- 7.3, TG- 196, LDL- 79, TSH- 2.08\par WBC- 2.67 with low ANC\par \par *** May 18, 2020 ***\par \par diagnosed with breast cancer. S/p lumpectomy. planned for XRT and hormonal therapy\par On toujeo 70 un hs, trulicity 1.5 qw, farxiga 10mg qd, fenofibrate  134 mg, Synthroid 112 mcg\par not taking crestor\par reports fbs- 120's- 130's, ppg- 160's\par \par *** Feb 11, 2020 ***\par \par on toujeo 70 un hs, trulicity 1.5 qw, farxiga 10mg qd\par not taking crestor b/o generalized myalgia\par TSH- 1.14\par TG-419\par a1c- 6.4\par \par no log, reports fbs- 140's, ppg- 160's\par today fbs in the office- 188\par denies hypo's\par \par \par \par *** Nov 13, 2019 ***\par recovering from cold. still on levaquin\par on toujeo 70 un hs, trulicity 1.5 qw, farxiga 10mg qd, synthroid 112 mcg, crestor 5mg qw (feels fine on this dose) \par not taking zetia\par no GES yet\par a1c- 6.7, iCa- 5.6, TSH- 1.9, ca- 10.0\par no Lipid panel done\par \par *** Aug 13, 2019 ***\par \par on  toujeo 70 un hs, trulicity 1.5 qw, farxiga 10mg qd,  synthroid  112 mcg, ramipril\par off HCTZ\par stopped crestor b/o myalgia. was rx'ed zetia\par off acarbose b/o GI issues\par did not do GES yet\par had an MRI abdomen done- reports normal results\par a1c- 6.3, f/am-272\par TSH- 1.09, FT4- 1.3, LDL- 122, TG- 289\par ca- 10.5\par no log, reports FBS- low 110's, ppg- upto 149\par \par *** May 29, 2019 ***\par \par s/p total tx (9/25/18). \par feels well. lost 3 lbs\par on  toujeo 70 un hs, trulicity 1.5 qw, farxiga 10mg qd,  acarbose 50mg with dinner (freq forgets to take it),  synthroid 112mcg\par - stopped fiasp b/o "stomach aches"\par off HCTZ b/o elevated calcium and noticed that BS is higher since then\par saw Dr. Crocker, had an EGD. still with bloating/ diarrhea. Per pt, symptoms started way prior to trulicity\par recalls 2 GES several yrs ago with equivocal results\par \par TSH- 1.3\par a1c- 6.4 <-- 6.6 <-- 8.6 <-- 8.2\par f/amine-265 <--  258\par g/queenie- 1.2\par \par - ca/PTH/D- nl\par reports FBS-  138- 202 , p/D- 150's- 160's. no more nocturnal hypo's\par \par FNA (7/20/18)- of LLP 2.6- benign follic nodule (Ramonita II)\par Thyr US (7/11/18)- multiple b/l nodules,incl dominant RMP 2.4, LMP 2.6 + 2.1, LLP 2.6.  All are stable\par \par 1mg ONDST- 1.6\par TSH- 0.19, FT4-1.5\par neg tpo/tg ab\par saw Dr Jalloh- s/p benign of Lt and isthmic nodules (no report is avail). \par \par *** stopped cycloset  b/o  stomach cramps, HA, fatigue \par \par Previously intolerant of metformin. \par failed lantus, toujeo, basaglar. tried tresiba\par \par HPI:\par Has been diagnosed with Hashimoto's thyroiditis and subclinical hyperthyroidism in 1998. Has been followed by Janet Hamm and Kandi, but was never placed on antithyroidal medications. \par \par Was also diagnosed with MNG, and underwent a biopsy of her dominant right thyroid nodule by Dr Jalloh, which was benign. \par No history of neck surgery or radiation exposure to the neck area other than radiologic studies.  Family history is negative for thyroid illness. \par Currently, there are no local neck symptoms of anterior neck pain or problems with breathing, speaking, or swallowing.  Patient denies symptoms of hypothyroidism or hyperthyroidism.\par \par Also, with DM2 since 2003. Denies any complications\par recalls most recent a1c in the 8's range\par Lab review: \par \par TSH- 0.25 <-- 0.083\par \par FT4- 1.15\par \par neg tpo/tg antibodies\par \par \par Thyroid US (4/10/18)- large thyroid. Multiple bilateral nodules, including dominant RMP iso complex 2.5x1.5x3.2; LLP hypo 2.6x2.1x2.3; LMP isosolid 2.4x1.1x.2.1, and isthmic solid iso 1.1x0.6x1.2\par \par 24h I-123 thyroid uptake and scan (4/25/18)- "cold nodules in the LLP and thyroid isthmus". Uptake- 15.1%\par \par FNA (11/07/17) of RMP 2.6cm nodule- colloid nodule\par \par \par ********\par \par last cardio - 2019 (Dr Stevens)\par last ST- 2019\par last echo- 2019\par last podiatry - several years ago\par last ophthalmology- 02/20\par

## 2020-08-18 NOTE — ASSESSMENT
[Hypoglycemia Management] : hypoglycemia management [Carbohydrate Consistent Diet] : carbohydrate consistent diet [Diabetes Foot Care] : diabetes foot care [Long Term Vascular Complications] : long term vascular complications of diabetes [Self Monitoring of Blood Glucose] : self monitoring of blood glucose [Action and use of Insulin] : action and use of short and long-acting insulin [Insulin Self-Administration] : insulin self-administration [FreeTextEntry1] : - pt declined retrying metformin at this time b/o fear GI problems\par - might retry tresiba once stomach issues resolve\par - toujeo 74 un hs, trulicity 1.5 qw, farxiga 10mg qd\par - keep off acarbose\par - refer for GES, and if (+), will stop trulicity. obtain the report of abd MRI\par - options for insulin pump/ sensor reviewed. different pumps discussed. pt will consider\par - obtain path report from Dr. Jalloh\par - synthroid  100 mcg \par - monitor ca/PTH\par - rpt fasting lipid panel- with direct LDL- can try crestor 5mg biw, and if tolerates will slowly uptitrate. might require combo with zetia\par - cont  fenofibrate 200 mg qd\par - f/u pulmonary (Dr. Lang)\par - advised to f/u with hem/onc re: neutropenia\par RTC  3 mos

## 2020-10-30 ENCOUNTER — RX RENEWAL (OUTPATIENT)
Age: 65
End: 2020-10-30

## 2020-12-30 ENCOUNTER — APPOINTMENT (OUTPATIENT)
Dept: ENDOCRINOLOGY | Facility: CLINIC | Age: 65
End: 2020-12-30

## 2021-02-12 ENCOUNTER — RX RENEWAL (OUTPATIENT)
Age: 66
End: 2021-02-12

## 2021-04-08 ENCOUNTER — APPOINTMENT (OUTPATIENT)
Dept: ENDOCRINOLOGY | Facility: CLINIC | Age: 66
End: 2021-04-08
Payer: MEDICARE

## 2021-04-08 VITALS
DIASTOLIC BLOOD PRESSURE: 60 MMHG | BODY MASS INDEX: 51.91 KG/M2 | HEART RATE: 91 BPM | TEMPERATURE: 97.6 F | SYSTOLIC BLOOD PRESSURE: 120 MMHG | WEIGHT: 293 LBS | HEIGHT: 63 IN | RESPIRATION RATE: 16 BRPM | OXYGEN SATURATION: 98 %

## 2021-04-08 LAB — GLUCOSE BLDC GLUCOMTR-MCNC: 150

## 2021-04-08 PROCEDURE — 82962 GLUCOSE BLOOD TEST: CPT

## 2021-04-08 PROCEDURE — 99214 OFFICE O/P EST MOD 30 MIN: CPT

## 2021-04-08 NOTE — HISTORY OF PRESENT ILLNESS
[FreeTextEntry1] : 66 yo female f/u  for management of  DM2 and thyroid nodules. \par \par *** Apr 08, 2021 ***\par \par on  toujeo 74 un hs, trulicity 1.5 qw, farxiga 10mg qd, fenofibrate 200mg, crestor 5 mg, synthroid 100 mcg\par reports fbs- under 120, ppg-not checking\par never proceeded with GES\par with worsening acid reflux. planning for EGD this month. otherwise, feels well\par \par labs (4/2/21)- TSH- 1.0, TG- 215, LDL- 82, a1c- 6.7, ca- 9.6, PTH-80\par \par *** Aug 18, 2020 ***\par \par on  toujeo 74 un hs, trulicity 1.5 qw, farxiga 10mg qd, fenofibrate 200mg, synthroid 100 mcg\par feels more tired, poss related to the radiation tx.\par hot flashes on letrozole\par reports fbs- 142-162, not checking ppg\par she stopped white bread and fruits within past 2 weeks- FS are much better\par today fbs- 116\par labs from 8/14/20- a1c- 7.3, TG- 196, LDL- 79, TSH- 2.08\par WBC- 2.67 with low ANC\par \par *** May 18, 2020 ***\par \par diagnosed with breast cancer. S/p lumpectomy. planned for XRT and hormonal therapy\par On toujeo 70 un hs, trulicity 1.5 qw, farxiga 10mg qd, fenofibrate  134 mg, Synthroid 112 mcg\par not taking crestor\par reports fbs- 120's- 130's, ppg- 160's\par \par *** Feb 11, 2020 ***\par \par on toujeo 70 un hs, trulicity 1.5 qw, farxiga 10mg qd\par not taking crestor b/o generalized myalgia\par TSH- 1.14\par TG-419\par a1c- 6.4\par \par no log, reports fbs- 140's, ppg- 160's\par today fbs in the office- 188\par denies hypo's\par \par \par \par *** Nov 13, 2019 ***\par recovering from cold. still on levaquin\par on toujeo 70 un hs, trulicity 1.5 qw, farxiga 10mg qd, synthroid 112 mcg, crestor 5mg qw (feels fine on this dose) \par not taking zetia\par no GES yet\par a1c- 6.7, iCa- 5.6, TSH- 1.9, ca- 10.0\par no Lipid panel done\par \par *** Aug 13, 2019 ***\par \par on  toujeo 70 un hs, trulicity 1.5 qw, farxiga 10mg qd,  synthroid  112 mcg, ramipril\par off HCTZ\par stopped crestor b/o myalgia. was rx'ed zetia\par off acarbose b/o GI issues\par did not do GES yet\par had an MRI abdomen done- reports normal results\par a1c- 6.3, f/am-272\par TSH- 1.09, FT4- 1.3, LDL- 122, TG- 289\par ca- 10.5\par no log, reports FBS- low 110's, ppg- upto 149\par \par *** May 29, 2019 ***\par \par s/p total tx (9/25/18). \par feels well. lost 3 lbs\par on  toujeo 70 un hs, trulicity 1.5 qw, farxiga 10mg qd,  acarbose 50mg with dinner (freq forgets to take it),  synthroid 112mcg\par - stopped fiasp b/o "stomach aches"\par off HCTZ b/o elevated calcium and noticed that BS is higher since then\par saw Dr. Crocker, had an EGD. still with bloating/ diarrhea. Per pt, symptoms started way prior to trulicity\par recalls 2 GES several yrs ago with equivocal results\par \par TSH- 1.3\par a1c- 6.4 <-- 6.6 <-- 8.6 <-- 8.2\par f/amine-265 <--  258\par g/queenie- 1.2\par \par - ca/PTH/D- nl\par reports FBS-  138- 202 , p/D- 150's- 160's. no more nocturnal hypo's\par \par FNA (7/20/18)- of LLP 2.6- benign follic nodule (Ramonita II)\par Thyr US (7/11/18)- multiple b/l nodules,incl dominant RMP 2.4, LMP 2.6 + 2.1, LLP 2.6.  All are stable\par \par 1mg ONDST- 1.6\par TSH- 0.19, FT4-1.5\par neg tpo/tg ab\par saw Dr Jalloh- s/p benign of Lt and isthmic nodules (no report is avail). \par \par *** stopped cycloset  b/o  stomach cramps, HA, fatigue \par \par Previously intolerant of metformin. \par failed lantus, toujeo, basaglar. tried tresiba\par \par HPI:\par Has been diagnosed with Hashimoto's thyroiditis and subclinical hyperthyroidism in 1998. Has been followed by Drs Noris and Kandi, but was never placed on antithyroidal medications. \par \par Was also diagnosed with MNG, and underwent a biopsy of her dominant right thyroid nodule by Dr Jalloh, which was benign. \par No history of neck surgery or radiation exposure to the neck area other than radiologic studies.  Family history is negative for thyroid illness. \par Currently, there are no local neck symptoms of anterior neck pain or problems with breathing, speaking, or swallowing.  Patient denies symptoms of hypothyroidism or hyperthyroidism.\par \par Also, with DM2 since 2003. Denies any complications\par recalls most recent a1c in the 8's range\par Lab review: \par \par TSH- 0.25 <-- 0.083\par \par FT4- 1.15\par \par neg tpo/tg antibodies\par \par \par Thyroid US (4/10/18)- large thyroid. Multiple bilateral nodules, including dominant RMP iso complex 2.5x1.5x3.2; LLP hypo 2.6x2.1x2.3; LMP isosolid 2.4x1.1x.2.1, and isthmic solid iso 1.1x0.6x1.2\par \par 24h I-123 thyroid uptake and scan (4/25/18)- "cold nodules in the LLP and thyroid isthmus". Uptake- 15.1%\par \par FNA (11/07/17) of RMP 2.6cm nodule- colloid nodule\par \par \par ********\par \par last cardio - 2019 (Dr Stevens)\par last ST- 2019\par last echo- 2019\par last podiatry - several years ago\par last ophthalmology- 02/20\par

## 2021-04-08 NOTE — ASSESSMENT
[FreeTextEntry1] : - pt declined retrying metformin at this time b/o fear GI problems\par - might retry tresiba once stomach issues resolve\par - toujeo 74 un hs, trulicity 1.5 qw, farxiga 10mg qd\par - keep off acarbose\par - again advised GES, and if (+), will stop trulicity. obtain the report of abd MRI. She'll inquire with Dr. Luciano\par - options for insulin pump/ sensor reviewed. different pumps discussed. pt will consider\par - obtain path report from Dr. Jalloh\par - synthroid  100 mcg \par - monitor ca/PTH\par - crestor 5mg qd. might require combo with zetia\par - cont  fenofibrate 200 mg qd\par - f/u pulmonary (Dr. Lang)\par - advised to f/u with hem/onc re: neutropenia\par RTC  3 mos [Carbohydrate Consistent Diet] : carbohydrate consistent diet [Hypoglycemia Management] : hypoglycemia management [Diabetes Foot Care] : diabetes foot care [Long Term Vascular Complications] : long term vascular complications of diabetes [Action and use of Insulin] : action and use of short and long-acting insulin [Self Monitoring of Blood Glucose] : self monitoring of blood glucose [Insulin Self-Administration] : insulin self-administration

## 2021-05-16 ENCOUNTER — RX RENEWAL (OUTPATIENT)
Age: 66
End: 2021-05-16

## 2021-08-09 ENCOUNTER — APPOINTMENT (OUTPATIENT)
Dept: ENDOCRINOLOGY | Facility: CLINIC | Age: 66
End: 2021-08-09
Payer: MEDICARE

## 2021-08-09 VITALS
SYSTOLIC BLOOD PRESSURE: 105 MMHG | RESPIRATION RATE: 16 BRPM | OXYGEN SATURATION: 97 % | TEMPERATURE: 97.7 F | HEART RATE: 95 BPM | BODY MASS INDEX: 25.69 KG/M2 | HEIGHT: 63 IN | DIASTOLIC BLOOD PRESSURE: 60 MMHG | WEIGHT: 145 LBS

## 2021-08-09 DIAGNOSIS — K31.84 GASTROPARESIS: ICD-10-CM

## 2021-08-09 LAB — GLUCOSE BLDC GLUCOMTR-MCNC: 140

## 2021-08-09 PROCEDURE — 82962 GLUCOSE BLOOD TEST: CPT

## 2021-08-09 PROCEDURE — 99214 OFFICE O/P EST MOD 30 MIN: CPT

## 2021-08-09 NOTE — ASSESSMENT
[Carbohydrate Consistent Diet] : carbohydrate consistent diet [Hypoglycemia Management] : hypoglycemia management [Diabetes Foot Care] : diabetes foot care [Long Term Vascular Complications] : long term vascular complications of diabetes [Action and use of Insulin] : action and use of short and long-acting insulin [Self Monitoring of Blood Glucose] : self monitoring of blood glucose [Insulin Self-Administration] : insulin self-administration [FreeTextEntry1] : - pt declined retrying metformin at this time b/o fear GI problems\par - might retry tresiba once stomach issues resolve\par - resume SMBG. advised on Naomie PRO, but she declined\par - toujeo 76 un hs, farxiga 10mg qd\par - actos 30 mg qd\par - keep off acarbose and hold Trulicity for few weeks and observe for GI issues \par - again advised GES, and if (+), will stop trulicity. obtain the report of abd MRI. She'll proceed with testing\par - options for insulin pump/ sensor reviewed. different pumps discussed. pt will consider\par - obtain path report from Dr. Jalloh\par - synthroid  100 mcg \par - monitor ca/PTH. will do 24 h Uca\par - crestor 5mg qd. might require combo with zetia\par - cont  fenofibrate 200 mg qd\par - f/u pulmonary (Dr. Lang)\par - advised to f/u with hem/onc re: neutropenia\par RTC  3 mos

## 2021-08-09 NOTE — HISTORY OF PRESENT ILLNESS
[FreeTextEntry1] : 65 yo female f/u  for management of  DM2 and thyroid nodules. \par \par *** Aug 09, 2021 ***\par \par on  toujeo 76 un hs, trulicity 1.5 qw, farxiga 10mg qd, fenofibrate 200mg, crestor 5 mg, synthroid 100 mcg\par labs from 8/2/21- a1c- 7.4, TG- 213, LDL- 57, 25D- 40, TSH- 0.53, ca- 10.0,  PTH- 113\par reports DXA dome with her rheumatologist 2 months ago- reportedly stable, not on ART\par stopped checking FS\par still with stomach issues. did not do GES yet and did not try stopping Trulicity  yet\par \par *** Apr 08, 2021 ***\par \par on  toujeo 74 un hs, trulicity 1.5 qw, farxiga 10mg qd, fenofibrate 200mg, crestor 5 mg, synthroid 100 mcg\par reports fbs- under 120, ppg-not checking\par never proceeded with GES\par with worsening acid reflux. planning for EGD this month. otherwise, feels well\par \par labs (4/2/21)- TSH- 1.0, TG- 215, LDL- 82, a1c- 6.7, ca- 9.6, PTH-80\par \par *** Aug 18, 2020 ***\par \par on  toujeo 74 un hs, trulicity 1.5 qw, farxiga 10mg qd, fenofibrate 200mg, synthroid 100 mcg\par feels more tired, poss related to the radiation tx.\par hot flashes on letrozole\par reports fbs- 142-162, not checking ppg\par she stopped white bread and fruits within past 2 weeks- FS are much better\par today fbs- 116\par labs from 8/14/20- a1c- 7.3, TG- 196, LDL- 79, TSH- 2.08\par WBC- 2.67 with low ANC\par \par *** May 18, 2020 ***\par \par diagnosed with breast cancer. S/p lumpectomy. planned for XRT and hormonal therapy\par On toujeo 70 un hs, trulicity 1.5 qw, farxiga 10mg qd, fenofibrate  134 mg, Synthroid 112 mcg\par not taking crestor\par reports fbs- 120's- 130's, ppg- 160's\par \par *** Feb 11, 2020 ***\par \par on toujeo 70 un hs, trulicity 1.5 qw, farxiga 10mg qd\par not taking crestor b/o generalized myalgia\par TSH- 1.14\par TG-419\par a1c- 6.4\par \par no log, reports fbs- 140's, ppg- 160's\par today fbs in the office- 188\par denies hypo's\par \par \par \par *** Nov 13, 2019 ***\par recovering from cold. still on levaquin\par on toujeo 70 un hs, trulicity 1.5 qw, farxiga 10mg qd, synthroid 112 mcg, crestor 5mg qw (feels fine on this dose) \par not taking zetia\par no GES yet\par a1c- 6.7, iCa- 5.6, TSH- 1.9, ca- 10.0\par no Lipid panel done\par \par *** Aug 13, 2019 ***\par \par on  toujeo 70 un hs, trulicity 1.5 qw, farxiga 10mg qd,  synthroid  112 mcg, ramipril\par off HCTZ\par stopped crestor b/o myalgia. was rx'ed zetia\par off acarbose b/o GI issues\par did not do GES yet\par had an MRI abdomen done- reports normal results\par a1c- 6.3, f/am-272\par TSH- 1.09, FT4- 1.3, LDL- 122, TG- 289\par ca- 10.5\par no log, reports FBS- low 110's, ppg- upto 149\par \par *** May 29, 2019 ***\par \par s/p total tx (9/25/18). \par feels well. lost 3 lbs\par on  toujeo 70 un hs, trulicity 1.5 qw, farxiga 10mg qd,  acarbose 50mg with dinner (freq forgets to take it),  synthroid 112mcg\par - stopped fiasp b/o "stomach aches"\par off HCTZ b/o elevated calcium and noticed that BS is higher since then\par saw Dr. Crocker, had an EGD. still with bloating/ diarrhea. Per pt, symptoms started way prior to trulicity\par recalls 2 GES several yrs ago with equivocal results\par \par TSH- 1.3\par a1c- 6.4 <-- 6.6 <-- 8.6 <-- 8.2\par f/amine-265 <--  258\par g/queenie- 1.2\par \par - ca/PTH/D- nl\par reports FBS-  138- 202 , p/D- 150's- 160's. no more nocturnal hypo's\par \par FNA (7/20/18)- of LLP 2.6- benign follic nodule (Ramonita II)\par Thyr US (7/11/18)- multiple b/l nodules,incl dominant RMP 2.4, LMP 2.6 + 2.1, LLP 2.6.  All are stable\par \par 1mg ONDST- 1.6\par TSH- 0.19, FT4-1.5\par neg tpo/tg ab\par saw Dr Jalloh- s/p benign of Lt and isthmic nodules (no report is avail). \par \par *** stopped cycloset  b/o  stomach cramps, HA, fatigue \par \par Previously intolerant of metformin. \par failed lantus, toujeo, basaglar. tried tresiba\par \par HPI:\par Has been diagnosed with Hashimoto's thyroiditis and subclinical hyperthyroidism in 1998. Has been followed by Janet Hamm and Kandi, but was never placed on antithyroidal medications. \par \par Was also diagnosed with MNG, and underwent a biopsy of her dominant right thyroid nodule by Dr Jalloh, which was benign. \par No history of neck surgery or radiation exposure to the neck area other than radiologic studies.  Family history is negative for thyroid illness. \par Currently, there are no local neck symptoms of anterior neck pain or problems with breathing, speaking, or swallowing.  Patient denies symptoms of hypothyroidism or hyperthyroidism.\par \par Also, with DM2 since 2003. Denies any complications\par recalls most recent a1c in the 8's range\par Lab review: \par \par TSH- 0.25 <-- 0.083\par \par FT4- 1.15\par \par neg tpo/tg antibodies\par \par \par Thyroid US (4/10/18)- large thyroid. Multiple bilateral nodules, including dominant RMP iso complex 2.5x1.5x3.2; LLP hypo 2.6x2.1x2.3; LMP isosolid 2.4x1.1x.2.1, and isthmic solid iso 1.1x0.6x1.2\par \par 24h I-123 thyroid uptake and scan (4/25/18)- "cold nodules in the LLP and thyroid isthmus". Uptake- 15.1%\par \par FNA (11/07/17) of RMP 2.6cm nodule- colloid nodule\par \par \par ********\par \par last cardio - 2019 (Dr Stevens)\par last ST- 2019\par last echo- 2019\par last podiatry - several years ago\par last ophthalmology- 02/20\par

## 2021-11-12 ENCOUNTER — RX RENEWAL (OUTPATIENT)
Age: 66
End: 2021-11-12

## 2021-12-14 ENCOUNTER — RX RENEWAL (OUTPATIENT)
Age: 66
End: 2021-12-14

## 2021-12-31 ENCOUNTER — TRANSCRIPTION ENCOUNTER (OUTPATIENT)
Age: 66
End: 2021-12-31

## 2022-01-26 ENCOUNTER — APPOINTMENT (OUTPATIENT)
Dept: ENDOCRINOLOGY | Facility: CLINIC | Age: 67
End: 2022-01-26
Payer: MEDICARE

## 2022-01-26 VITALS
HEART RATE: 62 BPM | BODY MASS INDEX: 26.22 KG/M2 | DIASTOLIC BLOOD PRESSURE: 60 MMHG | TEMPERATURE: 97.8 F | WEIGHT: 148 LBS | SYSTOLIC BLOOD PRESSURE: 120 MMHG | RESPIRATION RATE: 16 BRPM | OXYGEN SATURATION: 97 % | HEIGHT: 63 IN

## 2022-01-26 LAB — GLUCOSE BLDC GLUCOMTR-MCNC: 148

## 2022-01-26 PROCEDURE — 99214 OFFICE O/P EST MOD 30 MIN: CPT | Mod: 25

## 2022-01-26 PROCEDURE — 82962 GLUCOSE BLOOD TEST: CPT

## 2022-01-26 NOTE — ASSESSMENT
[FreeTextEntry1] : - pt declined retrying metformin at this time b/o fear GI problems\par - obtain labs from PCP\par - might retry tresiba once stomach issues resolve\par - cont  SMBG. \par - toujeo 76 un hs, farxiga 10mg qd\par - actos 30 mg qd\par - keep off acarbose\par - again advised GES, and if (+), will stop trulicity. obtain the report of abd MRI. She'll proceed with testing\par - options for insulin pump/ sensor reviewed. different pumps discussed. pt will consider\par - obtain path report from Dr. Jalloh\par - synthroid  100 mcg \par - monitor ca/PTH. will do 24 h Uca\par - crestor 5mg qd. might require combo with zetia\par - cont  fenofibrate 200 mg qd\par - f/u pulmonary (Dr. Lang)\par - advised to f/u with hem/onc re: neutropenia\par RTC  4  mos [Carbohydrate Consistent Diet] : carbohydrate consistent diet [Hypoglycemia Management] : hypoglycemia management [Diabetes Foot Care] : diabetes foot care [Long Term Vascular Complications] : long term vascular complications of diabetes [Action and use of Insulin] : action and use of short and long-acting insulin [Self Monitoring of Blood Glucose] : self monitoring of blood glucose [Insulin Self-Administration] : insulin self-administration

## 2022-01-26 NOTE — HISTORY OF PRESENT ILLNESS
[FreeTextEntry1] : 65 yo female f/u  for management of  DM2 and thyroid nodules. \par \par *** Jan 26, 2022 ***\par \par feels ok. still with a back pain related to LS. getting steroid injections\par seeing another GI\par still no GES . stopped trulicity x 3 weeks to see if there is any help with her stomach issues, but there was no relief\par \par on  toujeo 76 un hs, trulicity 1.5 qw, farxiga 10mg qd, fenofibrate 200mg, crestor 5 mg, synthroid 100 mcg\par labs done 2 days ago at PCP- results are pending\par per pt, was told a1c - 6.1, and TFT's were fine\par reports fbs and ppg-  low 100's\par denies hypo's\par \par \par *** Aug 09, 2021 ***\par \par on  toujeo 76 un hs, trulicity 1.5 qw, farxiga 10mg qd, fenofibrate 200mg, crestor 5 mg, synthroid 100 mcg\par labs from 8/2/21- a1c- 7.4, TG- 213, LDL- 57, 25D- 40, TSH- 0.53, ca- 10.0,  PTH- 113\par reports DXA dome with her rheumatologist 2 months ago- reportedly stable, not on ART\par stopped checking FS\par still with stomach issues. did not do GES yet and did not try stopping Trulicity  yet\par \par *** Apr 08, 2021 ***\par \par on  toujeo 74 un hs, trulicity 1.5 qw, farxiga 10mg qd, fenofibrate 200mg, crestor 5 mg, synthroid 100 mcg\par reports fbs- under 120, ppg-not checking\par never proceeded with GES\par with worsening acid reflux. planning for EGD this month. otherwise, feels well\par \par labs (4/2/21)- TSH- 1.0, TG- 215, LDL- 82, a1c- 6.7, ca- 9.6, PTH-80\par \par *** Aug 18, 2020 ***\par \par on  toujeo 74 un hs, trulicity 1.5 qw, farxiga 10mg qd, fenofibrate 200mg, synthroid 100 mcg\par feels more tired, poss related to the radiation tx.\par hot flashes on letrozole\par reports fbs- 142-162, not checking ppg\par she stopped white bread and fruits within past 2 weeks- FS are much better\par today fbs- 116\par labs from 8/14/20- a1c- 7.3, TG- 196, LDL- 79, TSH- 2.08\par WBC- 2.67 with low ANC\par \par *** May 18, 2020 ***\par \par diagnosed with breast cancer. S/p lumpectomy. planned for XRT and hormonal therapy\par On toujeo 70 un hs, trulicity 1.5 qw, farxiga 10mg qd, fenofibrate  134 mg, Synthroid 112 mcg\par not taking crestor\par reports fbs- 120's- 130's, ppg- 160's\par \par *** Feb 11, 2020 ***\par \par on toujeo 70 un hs, trulicity 1.5 qw, farxiga 10mg qd\par not taking crestor b/o generalized myalgia\par TSH- 1.14\par TG-419\par a1c- 6.4\par \par no log, reports fbs- 140's, ppg- 160's\par today fbs in the office- 188\par denies hypo's\par \par \par \par *** Nov 13, 2019 ***\par recovering from cold. still on levaquin\par on toujeo 70 un hs, trulicity 1.5 qw, farxiga 10mg qd, synthroid 112 mcg, crestor 5mg qw (feels fine on this dose) \par not taking zetia\par no GES yet\par a1c- 6.7, iCa- 5.6, TSH- 1.9, ca- 10.0\par no Lipid panel done\par \par *** Aug 13, 2019 ***\par \par on  toujeo 70 un hs, trulicity 1.5 qw, farxiga 10mg qd,  synthroid  112 mcg, ramipril\par off HCTZ\par stopped crestor b/o myalgia. was rx'ed zetia\par off acarbose b/o GI issues\par did not do GES yet\par had an MRI abdomen done- reports normal results\par a1c- 6.3, f/am-272\par TSH- 1.09, FT4- 1.3, LDL- 122, TG- 289\par ca- 10.5\par no log, reports FBS- low 110's, ppg- upto 149\par \par *** May 29, 2019 ***\par \par s/p total tx (9/25/18). \par feels well. lost 3 lbs\par on  toujeo 70 un hs, trulicity 1.5 qw, farxiga 10mg qd,  acarbose 50mg with dinner (freq forgets to take it),  synthroid 112mcg\par - stopped fiasp b/o "stomach aches"\par off HCTZ b/o elevated calcium and noticed that BS is higher since then\par saw Dr. Crocker, had an EGD. still with bloating/ diarrhea. Per pt, symptoms started way prior to trulicity\par recalls 2 GES several yrs ago with equivocal results\par \par TSH- 1.3\par a1c- 6.4 <-- 6.6 <-- 8.6 <-- 8.2\par f/amine-265 <--  258\par g/queenie- 1.2\par \par - ca/PTH/D- nl\par reports FBS-  138- 202 , p/D- 150's- 160's. no more nocturnal hypo's\par \par FNA (7/20/18)- of LLP 2.6- benign follic nodule (Ramonita II)\par Thyr US (7/11/18)- multiple b/l nodules,incl dominant RMP 2.4, LMP 2.6 + 2.1, LLP 2.6.  All are stable\par \par 1mg ONDST- 1.6\par TSH- 0.19, FT4-1.5\par neg tpo/tg ab\par saw Dr Jalloh- s/p benign of Lt and isthmic nodules (no report is avail). \par \par *** stopped cycloset  b/o  stomach cramps, HA, fatigue \par \par Previously intolerant of metformin. \par failed lantus, toujeo, basaglar. tried tresiba\par \par HPI:\par Has been diagnosed with Hashimoto's thyroiditis and subclinical hyperthyroidism in 1998. Has been followed by Janet Hamm and [facility], but was never placed on antithyroidal medications. \par \par Was also diagnosed with MNG, and underwent a biopsy of her dominant right thyroid nodule by Dr Jalloh, which was benign. \par No history of neck surgery or radiation exposure to the neck area other than radiologic studies.  Family history is negative for thyroid illness. \par Currently, there are no local neck symptoms of anterior neck pain or problems with breathing, speaking, or swallowing.  Patient denies symptoms of hypothyroidism or hyperthyroidism.\par \par Also, with DM2 since 2003. Denies any complications\par recalls most recent a1c in the 8's range\par Lab review: \par \par TSH- 0.25 <-- 0.083\par \par FT4- 1.15\par \par neg tpo/tg antibodies\par \par \par Thyroid US (4/10/18)- large thyroid. Multiple bilateral nodules, including dominant RMP iso complex 2.5x1.5x3.2; LLP hypo 2.6x2.1x2.3; LMP isosolid 2.4x1.1x.2.1, and isthmic solid iso 1.1x0.6x1.2\par \par 24h I-123 thyroid uptake and scan (4/25/18)- "cold nodules in the LLP and thyroid isthmus". Uptake- 15.1%\par \par FNA (11/07/17) of RMP 2.6cm nodule- colloid nodule\par \par \par ********\par \par last cardio - 2019 (Dr Stevens)\par last ST- 2019\par last echo- 2019\par last podiatry - several years ago\par last ophthalmology- 8/21\par

## 2022-02-07 ENCOUNTER — APPOINTMENT (OUTPATIENT)
Dept: SPINE | Facility: CLINIC | Age: 67
End: 2022-02-07
Payer: MEDICARE

## 2022-02-07 ENCOUNTER — NON-APPOINTMENT (OUTPATIENT)
Age: 67
End: 2022-02-07

## 2022-02-07 VITALS
HEIGHT: 63.5 IN | BODY MASS INDEX: 25.55 KG/M2 | OXYGEN SATURATION: 93 % | SYSTOLIC BLOOD PRESSURE: 122 MMHG | DIASTOLIC BLOOD PRESSURE: 81 MMHG | HEART RATE: 87 BPM | WEIGHT: 146 LBS

## 2022-02-07 PROCEDURE — 99204 OFFICE O/P NEW MOD 45 MIN: CPT

## 2022-02-07 RX ORDER — FENOFIBRATE 200 MG/1
200 CAPSULE ORAL
Qty: 90 | Refills: 3 | Status: DISCONTINUED | COMMUNITY
Start: 2020-02-12 | End: 2022-02-07

## 2022-02-07 RX ORDER — PREGABALIN 50 MG/1
50 CAPSULE ORAL
Refills: 0 | Status: DISCONTINUED | COMMUNITY
End: 2022-02-07

## 2022-02-07 RX ORDER — PIOGLITAZONE HYDROCHLORIDE 30 MG/1
30 TABLET ORAL DAILY
Qty: 90 | Refills: 2 | Status: DISCONTINUED | COMMUNITY
Start: 2021-08-09 | End: 2022-02-07

## 2022-02-07 RX ORDER — HYDROXYCHLOROQUINE SULFATE 400 MG/1
TABLET ORAL
Refills: 0 | Status: DISCONTINUED | COMMUNITY
End: 2022-02-07

## 2022-03-22 ENCOUNTER — RX RENEWAL (OUTPATIENT)
Age: 67
End: 2022-03-22

## 2022-03-22 RX ORDER — LEVOTHYROXINE SODIUM 0.1 MG/1
100 TABLET ORAL DAILY
Qty: 90 | Refills: 2 | Status: ACTIVE | COMMUNITY
Start: 2019-02-26 | End: 1900-01-01

## 2022-04-19 ENCOUNTER — APPOINTMENT (OUTPATIENT)
Dept: ORTHOPEDIC SURGERY | Facility: CLINIC | Age: 67
End: 2022-04-19
Payer: MEDICARE

## 2022-04-26 ENCOUNTER — APPOINTMENT (OUTPATIENT)
Dept: ORTHOPEDIC SURGERY | Facility: CLINIC | Age: 67
End: 2022-04-26
Payer: MEDICARE

## 2022-04-26 VITALS — BODY MASS INDEX: 25.87 KG/M2 | HEIGHT: 63 IN | WEIGHT: 146 LBS

## 2022-04-26 PROCEDURE — 20610 DRAIN/INJ JOINT/BURSA W/O US: CPT

## 2022-04-26 PROCEDURE — 99213 OFFICE O/P EST LOW 20 MIN: CPT | Mod: 25

## 2022-04-26 NOTE — PHYSICAL EXAM
[Left] : left knee [4___] : hamstring 4[unfilled]/5 [Positive] : positive Yahaira [Right] : right knee [] : negative Varus instability [FreeTextEntry8] : old healed total  quad weakness and atrophy [TWNoteComboBox7] : flexion 135 degrees

## 2022-04-26 NOTE — HISTORY OF PRESENT ILLNESS
[3] : 3 [1] : 2 [Intermittent] : intermittent [Rest] : rest [FreeTextEntry1] : chapis knees  [de-identified] : motion

## 2022-04-26 NOTE — REASON FOR VISIT
[FreeTextEntry2] : pt is here fro a fu visit of both knees. pt has slight discomfort in the right knee. pt had an injection zilretta in the left knee on her last visit which help a lot.

## 2022-04-26 NOTE — ASSESSMENT
[FreeTextEntry1] : left knee \par The risks, benefits and contents of the injection have been discussed.  Risks include but are not limited to allergic reaction, flare reaction, permanent white skin discoloration at the injection site and infection.  The patient understands the risks and agrees to having the injection.  All questions have been answered.\par evaluated knee and decided to proceed with trivisc injections, discussed future treatment and option, will proceed, discussed possible surgery vs alternatives in future\par The risks, benefits and contents of the injection have been discussed.  Risks include but are not limited to allergic reaction, flare reaction, permanent white skin discoloration at the injection site and infection.  The patient understands the risks and agrees to having the injection.  All questions have been answered.\par \par right knee quad weakness  receommend a hinged knee brace at next visit for funcitnal support \par also discussed her back could be causing some of jher weakness and pain down the leg.  she saw two back surgreons  one recoemmend surgrey one recommend stimulator \par Large joint injection was performed of the left. The indication for this procedure was pain, inflammation and x-ray evidence of Osteoarthritis on this or prior visit. The site was prepped with betadine, ethyl chloride sprayed topically and sterile technique used. An injection of Trivisc, series [ ] was used. \par Patient was advised to call if redness, pain or fever occur and apply ice for 15 minutes out of every hour for the next 12-24 hours as tolerated. \par \par Patient has tried OTC's including aspirin, Ibuprofen, Aleve, etc or prescription NSAIDS, and/or exercises at home and/or physical therapy without satisfactory response, patient had decreased mobility in the joint and the risks benefits, and alternatives have been discussed, and verbal consent was obtained. \par \par

## 2022-05-03 ENCOUNTER — APPOINTMENT (OUTPATIENT)
Dept: ORTHOPEDIC SURGERY | Facility: CLINIC | Age: 67
End: 2022-05-03
Payer: MEDICARE

## 2022-05-03 VITALS — HEIGHT: 63 IN | BODY MASS INDEX: 25.87 KG/M2 | WEIGHT: 146 LBS

## 2022-05-03 PROCEDURE — 20610 DRAIN/INJ JOINT/BURSA W/O US: CPT

## 2022-05-03 PROCEDURE — 99212 OFFICE O/P EST SF 10 MIN: CPT | Mod: 25

## 2022-05-03 NOTE — PHYSICAL EXAM
[Left] : left knee [4___] : hamstring 4[unfilled]/5 [] : negative Varus instability [Positive] : positive Yahaira [Right] : right knee [FreeTextEntry8] : old healed total  quad weakness and atrophy [TWNoteComboBox7] : flexion 135 degrees

## 2022-05-03 NOTE — DISCUSSION/SUMMARY
[Medication Risks Reviewed] : Medication risks reviewed [Surgical risks reviewed] : Surgical risks reviewed [de-identified] : evaluated knee and decided to proceed with trivisc injections, discussed future treatment and option, will proceed, discussed possible surgery vs alternatives in future\par The risks, benefits and contents of the injection have been discussed.  Risks include but are not limited to allergic reaction, flare reaction, permanent white skin discoloration at the injection site and infection.  The patient understands the risks and agrees to having the injection.  All questions have been answered.\par \par

## 2022-05-03 NOTE — REASON FOR VISIT
[FreeTextEntry2] : pt is here for fu visit of the left knee. pt had an injection on the left knee  in her last visit. pt has noticed an slight improvement in the left knee after the the 1st injection.

## 2022-05-03 NOTE — HISTORY OF PRESENT ILLNESS
[3] : 3 [1] : 2 [Dull/Aching] : dull/aching [Intermittent] : intermittent [Rest] : rest [FreeTextEntry1] : left knee

## 2022-05-03 NOTE — ASSESSMENT
[FreeTextEntry1] : left knee \par The risks, benefits and contents of the injection have been discussed.  Risks include but are not limited to allergic reaction, flare reaction, permanent white skin discoloration at the injection site and infection.  The patient understands the risks and agrees to having the injection.  All questions have been answered.\par evaluated knee and decided to proceed with trivisc injections, discussed future treatment and option, will proceed, discussed possible surgery vs alternatives in future\par The risks, benefits and contents of the injection have been discussed.  Risks include but are not limited to allergic reaction, flare reaction, permanent white skin discoloration at the injection site and infection.  The patient understands the risks and agrees to having the injection.  All questions have been answered.\par \par right knee quad weakness  receommend a hinged knee brace at next visit for funcitnal support \par Large joint injection was performed of the left. The indication for this procedure was pain, inflammation and x-ray evidence of Osteoarthritis on this or prior visit. The site was prepped with betadine, ethyl chloride sprayed topically and sterile technique used. An injection of Trivisc, series [ ] was used. \par Patient was advised to call if redness, pain or fever occur and apply ice for 15 minutes out of every hour for the next 12-24 hours as tolerated. \par \par Patient has tried OTC's including aspirin, Ibuprofen, Aleve, etc or prescription NSAIDS, and/or exercises at home and/or physical therapy without satisfactory response, patient had decreased mobility in the joint and the risks benefits, and alternatives have been discussed, and verbal consent was obtained. \par \par

## 2022-05-10 ENCOUNTER — APPOINTMENT (OUTPATIENT)
Dept: ORTHOPEDIC SURGERY | Facility: CLINIC | Age: 67
End: 2022-05-10
Payer: MEDICARE

## 2022-05-10 PROCEDURE — 20610 DRAIN/INJ JOINT/BURSA W/O US: CPT | Mod: LT

## 2022-05-10 PROCEDURE — 99212 OFFICE O/P EST SF 10 MIN: CPT | Mod: 25

## 2022-05-11 NOTE — DISCUSSION/SUMMARY
[Medication Risks Reviewed] : Medication risks reviewed [Surgical risks reviewed] : Surgical risks reviewed [de-identified] : evaluated knee and decided to proceed with trivisc injections, discussed future treatment and option, will proceed, discussed possible surgery vs alternatives in future\par The risks, benefits and contents of the injection have been discussed.  Risks include but are not limited to allergic reaction, flare reaction, permanent white skin discoloration at the injection site and infection.  The patient understands the risks and agrees to having the injection.  All questions have been answered.\par \par discusse dtimingof inejciton and will follwoup as needed

## 2022-05-11 NOTE — PROCEDURE
[FreeTextEntry3] : Large joint injection was performed of the left. The indication for this procedure was pain, inflammation and x-ray evidence of Osteoarthritis on this or prior visit. The site was prepped with betadine, ethyl chloride sprayed topically and sterile technique used. An injection of Trivisc, series [ ] was used. \par Patient was advised to call if redness, pain or fever occur and apply ice for 15 minutes out of every hour for the next 12-24 hours as tolerated. \par \par Patient has tried OTC's including aspirin, Ibuprofen, Aleve, etc or prescription NSAIDS, and/or exercises at home and/or physical therapy without satisfactory response, patient had decreased mobility in the joint and the risks benefits, and alternatives have been discussed, and verbal consent was obtained. \par \par #3\par

## 2022-05-12 ENCOUNTER — RX RENEWAL (OUTPATIENT)
Age: 67
End: 2022-05-12

## 2022-06-03 ENCOUNTER — APPOINTMENT (OUTPATIENT)
Dept: SPINE | Facility: HOSPITAL | Age: 67
End: 2022-06-03

## 2022-06-29 ENCOUNTER — RX RENEWAL (OUTPATIENT)
Age: 67
End: 2022-06-29

## 2022-07-18 ENCOUNTER — RX RENEWAL (OUTPATIENT)
Age: 67
End: 2022-07-18

## 2022-07-19 ENCOUNTER — APPOINTMENT (OUTPATIENT)
Dept: ORTHOPEDIC SURGERY | Facility: CLINIC | Age: 67
End: 2022-07-19

## 2022-07-19 VITALS — HEIGHT: 63 IN | BODY MASS INDEX: 25.87 KG/M2 | WEIGHT: 146 LBS

## 2022-07-19 PROCEDURE — 20611 DRAIN/INJ JOINT/BURSA W/US: CPT

## 2022-07-19 PROCEDURE — 99213 OFFICE O/P EST LOW 20 MIN: CPT | Mod: 25

## 2022-07-19 PROCEDURE — J3490M: CUSTOM

## 2022-07-19 NOTE — PROCEDURE
[Large Joint Injection] : Large joint injection [Left] : of the left [Knee] : knee [Pain] : pain [Inflammation] : inflammation [X-ray evidence of Osteoarthritis on this or prior visit] : x-ray evidence of Osteoarthritis on this or prior visit [Betadine] : betadine [Ethyl Chloride sprayed topically] : ethyl chloride sprayed topically [Sterile technique used] : sterile technique used [___ cc    0.25%] : Bupivacaine (Marcaine) ~Vcc of 0.25%  [___ cc    32 units 5mg] : Zilretta ~Vcc of 32 units 5 mg  [] : Patient tolerated procedure well [Call if redness, pain or fever occur] : call if redness, pain or fever occur [Apply ice for 15min out of every hour for the next 12-24 hours as tolerated] : apply ice for 15 minutes out of every hour for the next 12-24 hours as tolerated [Previous OTC use and PT nontherapeutic] : patient has tried OTC's including aspirin, Ibuprofen, Aleve, etc or prescription NSAIDS, and/or exercises at home and/or physical therapy without satisfactory response [Risks, benefits, alternatives discussed / Verbal consent obtained] : the risks benefits, and alternatives have been discussed, and verbal consent was obtained

## 2022-07-19 NOTE — HISTORY OF PRESENT ILLNESS
[de-identified] : Patient is here for a follow up of the left knee. Pt had TriVisc Injections in left knee that ended 6/10/22. Pt had some relief in the left knee from injections, pain in left knee started 1 month ago. Pt is having pain in left knee with certain movements and at night.

## 2022-07-19 NOTE — DISCUSSION/SUMMARY
[Medication Risks Reviewed] : Medication risks reviewed [Surgical risks reviewed] : Surgical risks reviewed [de-identified] : zilretta injection from February gave relief. evaluated knee and decided to proceed with zilretta injections, discussed future treatment and option, will proceed, discussed possible need for surgery vs alternatives in future\par The risks, benefits and contents of the injection have been discussed.  Risks include but are not limited to allergic reaction, flare reaction, permanent white skin discoloration at the injection site and infection.  The patient understands the risks and agrees to having the injection.  All questions have been answered.\par Zilretta in the LEFT KNEE Discussed the timing of the injections and the follow up that is needed. Advised the pt to ice the area that was injected and informed the pt it may take a few days for the injection to give relief.\par Aspiration was done as well

## 2022-07-19 NOTE — PHYSICAL EXAM
[Left] : left knee [3___] : quadriceps 3[unfilled]/5 [4___] : hamstring 4[unfilled]/5 [Positive] : positive Yahaira [Right] : right knee [] : negative Varus instability [FreeTextEntry8] : old healed total  quad weakness and atrophy [TWNoteComboBox7] : flexion 135 degrees

## 2022-07-21 ENCOUNTER — APPOINTMENT (OUTPATIENT)
Dept: PAIN MANAGEMENT | Facility: CLINIC | Age: 67
End: 2022-07-21

## 2022-07-21 VITALS — HEIGHT: 63 IN | BODY MASS INDEX: 25.87 KG/M2 | WEIGHT: 146 LBS

## 2022-07-21 PROCEDURE — 99214 OFFICE O/P EST MOD 30 MIN: CPT

## 2022-07-21 NOTE — PHYSICAL EXAM
[de-identified] : PHYSICAL EXAM\par \par Constitutional: \par Appears well, no apparent distress\par Ability to communicate: Normal\par Respiratory: non-labored breathing\par Skin: no rash noted\par Head: normocephalic, atraumatic\par Neck: no visible thyroid enlargement\par Eyes: extraocular movements intact\par Neurologic: alert and oriented x3\par Psychiatric: normal mood, affect, and behavior\par \par Lumbar Spine: \par Palpation: left lumbar paraspinal spasm and left lumbar paraspinal tenderness to palpation.\par ROM: Diminished range of motion in all plains.  Patient notes pain with lateral bending to the left.\par MMT: Motor exam is 5/5 through out bilateral lower extremities.\par Sensation: Light touch and pain is intact throughout bilateral lower extremities.\par Reflexes: achilles and patella reflexes are intact and  symmetrical.  No sustained clonus.\par Special Testing: Positive straight leg raise on the left side.\par \par Assessemnt:\par Radiculopathy of lumbosacral region (M54.17)\par Myalgia (M79.10)\par \par Plan:\par After discussing various treatment options with the patient including but not limited to oral medications, physical therapy, exercise modalities as well as interventional spinal injections, we have decided with the following plan:\par The patient is presenting with acute/sub-acute radicular pain with impairment in ADLs and functionality.  The pain has not responded to conservative care including NSAID therapy and/or physical therapy.  There is no bleeding tendency, unstable medical condition, or systemic infection\par \par The risks, benefits and alternatives of the proposed procedure were explained in detail with the patient.  The risks outlined include but are not limited to infection, bleeding, post dural puncture headache, nerve injury, a temporary increase in pain, failure to resolve symptoms, allergic reaction, symptom recurrence, and possible elevation of blood sugar.  All questions were answered to patient's satisfaction and he/she verbalized an understanding.\par \par Follow up 1-2 weeks post injection foe re-evaluation.\par \par Continue home exercises, stretching, activity modification, physical therapy, and conservative care.\par \par \par

## 2022-07-21 NOTE — HISTORY OF PRESENT ILLNESS
[Lower back] : lower back [9] : 9 [Dull/Aching] : dull/aching [Radiating] : radiating [Constant] : constant [Sleep] : sleep [Nothing helps with pain getting better] : Nothing helps with pain getting better [Lying in bed] : lying in bed [Retired] : Work status: retired [de-identified] : pt is following up for lower back pain  [] : no [FreeTextEntry7] : left side

## 2022-07-29 ENCOUNTER — NON-APPOINTMENT (OUTPATIENT)
Age: 67
End: 2022-07-29

## 2022-08-02 ENCOUNTER — APPOINTMENT (OUTPATIENT)
Age: 67
End: 2022-08-02

## 2022-08-02 PROCEDURE — 64483 NJX AA&/STRD TFRM EPI L/S 1: CPT | Mod: LT

## 2022-08-02 PROCEDURE — 64484 NJX AA&/STRD TFRM EPI L/S EA: CPT | Mod: LT

## 2022-08-17 ENCOUNTER — APPOINTMENT (OUTPATIENT)
Dept: PAIN MANAGEMENT | Facility: CLINIC | Age: 67
End: 2022-08-17

## 2022-08-17 ENCOUNTER — NON-APPOINTMENT (OUTPATIENT)
Age: 67
End: 2022-08-17

## 2022-08-17 VITALS — HEIGHT: 63 IN | BODY MASS INDEX: 25.52 KG/M2 | WEIGHT: 144 LBS

## 2022-08-17 PROCEDURE — 99213 OFFICE O/P EST LOW 20 MIN: CPT

## 2022-08-17 NOTE — HISTORY OF PRESENT ILLNESS
[Lower back] : lower back [5] : 5 [9] : 9 [Stabbing] : stabbing [] : yes [Constant] : constant [Sleep] : sleep [Sitting] : sitting [Walking/activity] : walking/activity [Lying in bed] : lying in bed [FreeTextEntry7] : left leg

## 2022-08-17 NOTE — PHYSICAL EXAM
[de-identified] : PHYSICAL EXAM\par \par Constitutional: \par Appears well, no apparent distress\par Ability to communicate: Normal\par Respiratory: non-labored breathing\par Skin: no rash noted\par Head: normocephalic, atraumatic\par Neck: no visible thyroid enlargement\par Eyes: extraocular movements intact\par Neurologic: alert and oriented x3\par Psychiatric: normal mood, affect, and behavior\par \par \par Back, including spine: Range of motion of the thoracic and lumbar spine is as follows: Diminished range of motion in all planes.  MMT 5/5 BL LE.  Sensation is intact to light touch and pin prick BL LE.  Negative Straight leg raise testing bilaterally.  Negatvie Kemps maneuver bilaterally.  Normal Gait.\par \par \par Assessment:\par Lumbago\par \par Plan:\par After discussing various treatment options with the patient including but not limited to oral medications, physical therapy, exercise modalities as well as interventional spinal injections, we have decided with the following plan:\par  \par Continue home exercises, stretching, activity modification, physical therapy, and conservative care.\par \par \par

## 2022-08-17 NOTE — REASON FOR VISIT
[FreeTextEntry2] : f/u to  injection \par Procedure: Left L4-5,L5-S1 transforaminal epidural steroid injection under fluoroscopic guidance\par Anesthesia: Local with sedation and nasal oxygen(DOS:08/02/2022)\par An

## 2022-08-17 NOTE — PHYSICAL EXAM
[de-identified] : PHYSICAL EXAM\par \par Constitutional: \par Appears well, no apparent distress\par Ability to communicate: Normal\par Respiratory: non-labored breathing\par Skin: no rash noted\par Head: normocephalic, atraumatic\par Neck: no visible thyroid enlargement\par Eyes: extraocular movements intact\par Neurologic: alert and oriented x3\par Psychiatric: normal mood, affect, and behavior\par \par \par Back, including spine: Range of motion of the thoracic and lumbar spine is as follows: Diminished range of motion in all planes.  MMT 5/5 BL LE.  Sensation is intact to light touch and pin prick BL LE.  Negative Straight leg raise testing bilaterally.  Negatvie Kemps maneuver bilaterally.  Normal Gait.\par \par \par Assessment:\par Lumbago\par \par Plan:\par After discussing various treatment options with the patient including but not limited to oral medications, physical therapy, exercise modalities as well as interventional spinal injections, we have decided with the following plan:\par  \par Continue home exercises, stretching, activity modification, physical therapy, and conservative care.\par \par \par

## 2022-08-29 ENCOUNTER — NON-APPOINTMENT (OUTPATIENT)
Age: 67
End: 2022-08-29

## 2022-09-08 ENCOUNTER — APPOINTMENT (OUTPATIENT)
Dept: ENDOCRINOLOGY | Facility: CLINIC | Age: 67
End: 2022-09-08

## 2022-09-08 VITALS
OXYGEN SATURATION: 98 % | TEMPERATURE: 98 F | RESPIRATION RATE: 16 BRPM | HEIGHT: 63 IN | HEART RATE: 85 BPM | BODY MASS INDEX: 25.16 KG/M2 | DIASTOLIC BLOOD PRESSURE: 66 MMHG | SYSTOLIC BLOOD PRESSURE: 122 MMHG | WEIGHT: 142 LBS

## 2022-09-08 PROCEDURE — 99214 OFFICE O/P EST MOD 30 MIN: CPT | Mod: 25

## 2022-09-08 PROCEDURE — 36415 COLL VENOUS BLD VENIPUNCTURE: CPT

## 2022-09-08 NOTE — QUALITY MEASURES
[Visual inspection, sensory exam] : Foot exam, including visual inspection, sensory exam with mono filament, and pulse exam, was performed within the last 12 months
Initial (On Arrival)

## 2022-09-08 NOTE — HISTORY OF PRESENT ILLNESS
[FreeTextEntry1] : 66 yo female f/u  for management of  DM2 and thyroid nodules. \par \par *** Sep 08, 2022 ***\par \par had covid in 05/22. still with a post-covid fatigue. \par lost weight. new "wet" hot flashes \par taking toujeo 76 un hs, farxiga 10mg qd,  actos 30 mg qd,  fenofibrate 200mg, crestor 5 mg\par resumed trulcity 1.5 mg qw\par PCP lowered  synthroid to 88 mcg last month\par not checking her FS\par \par *** Jan 26, 2022 ***\par \par feels ok. still with a back pain related to LS. getting steroid injections\par seeing another GI\par still no GES . stopped trulicity x 3 weeks to see if there is any help with her stomach issues, but there was no relief\par \par on  toujeo 76 un hs, trulicity 1.5 qw, farxiga 10mg qd, fenofibrate 200mg, crestor 5 mg, synthroid 100 mcg\par labs done 2 days ago at PCP- results are pending\par per pt, was told a1c - 6.1, and TFT's were fine\par reports fbs and ppg-  low 100's\par denies hypo's\par \par \par *** Aug 09, 2021 ***\par \par on  toujeo 76 un hs, trulicity 1.5 qw, farxiga 10mg qd, fenofibrate 200mg, crestor 5 mg, synthroid 100 mcg\par labs from 8/2/21- a1c- 7.4, TG- 213, LDL- 57, 25D- 40, TSH- 0.53, ca- 10.0,  PTH- 113\par reports DXA dome with her rheumatologist 2 months ago- reportedly stable, not on ART\par stopped checking FS\par still with stomach issues. did not do GES yet and did not try stopping Trulicity  yet\par \par *** Apr 08, 2021 ***\par \par on  toujeo 74 un hs, trulicity 1.5 qw, farxiga 10mg qd, fenofibrate 200mg, crestor 5 mg, synthroid 100 mcg\par reports fbs- under 120, ppg-not checking\par never proceeded with GES\par with worsening acid reflux. planning for EGD this month. otherwise, feels well\par \par labs (4/2/21)- TSH- 1.0, TG- 215, LDL- 82, a1c- 6.7, ca- 9.6, PTH-80\par \par *** Aug 18, 2020 ***\par \par on  toujeo 74 un hs, trulicity 1.5 qw, farxiga 10mg qd, fenofibrate 200mg, synthroid 100 mcg\par feels more tired, poss related to the radiation tx.\par hot flashes on letrozole\par reports fbs- 142-162, not checking ppg\par she stopped white bread and fruits within past 2 weeks- FS are much better\par today fbs- 116\par labs from 8/14/20- a1c- 7.3, TG- 196, LDL- 79, TSH- 2.08\par WBC- 2.67 with low ANC\par \par *** May 18, 2020 ***\par \par diagnosed with breast cancer. S/p lumpectomy. planned for XRT and hormonal therapy\par On toujeo 70 un hs, trulicity 1.5 qw, farxiga 10mg qd, fenofibrate  134 mg, Synthroid 112 mcg\par not taking crestor\par reports fbs- 120's- 130's, ppg- 160's\par \par *** Feb 11, 2020 ***\par \par on toujeo 70 un hs, trulicity 1.5 qw, farxiga 10mg qd\par not taking crestor b/o generalized myalgia\par TSH- 1.14\par TG-419\par a1c- 6.4\par \par no log, reports fbs- 140's, ppg- 160's\par today fbs in the office- 188\par denies hypo's\par \par \par \par *** Nov 13, 2019 ***\par recovering from cold. still on levaquin\par on toujeo 70 un hs, trulicity 1.5 qw, farxiga 10mg qd, synthroid 112 mcg, crestor 5mg qw (feels fine on this dose) \par not taking zetia\par no GES yet\par a1c- 6.7, iCa- 5.6, TSH- 1.9, ca- 10.0\par no Lipid panel done\par \par *** Aug 13, 2019 ***\par \par on  toujeo 70 un hs, trulicity 1.5 qw, farxiga 10mg qd,  synthroid  112 mcg, ramipril\par off HCTZ\par stopped crestor b/o myalgia. was rx'ed zetia\par off acarbose b/o GI issues\par did not do GES yet\par had an MRI abdomen done- reports normal results\par a1c- 6.3, f/am-272\par TSH- 1.09, FT4- 1.3, LDL- 122, TG- 289\par ca- 10.5\par no log, reports FBS- low 110's, ppg- upto 149\par \par *** May 29, 2019 ***\par \par s/p total tx (9/25/18). \par feels well. lost 3 lbs\par on  toujeo 70 un hs, trulicity 1.5 qw, farxiga 10mg qd,  acarbose 50mg with dinner (freq forgets to take it),  synthroid 112mcg\par - stopped fiasp b/o "stomach aches"\par off HCTZ b/o elevated calcium and noticed that BS is higher since then\par saw Dr. Crocker, had an EGD. still with bloating/ diarrhea. Per pt, symptoms started way prior to trulicity\par recalls 2 GES several yrs ago with equivocal results\par \par TSH- 1.3\par a1c- 6.4 <-- 6.6 <-- 8.6 <-- 8.2\par f/amine-265 <--  258\par g/queenie- 1.2\par \par - ca/PTH/D- nl\par reports FBS-  138- 202 , p/D- 150's- 160's. no more nocturnal hypo's\par \par FNA (7/20/18)- of LLP 2.6- benign follic nodule (Ramonita II)\par Thyr US (7/11/18)- multiple b/l nodules,incl dominant RMP 2.4, LMP 2.6 + 2.1, LLP 2.6.  All are stable\par \par 1mg ONDST- 1.6\par TSH- 0.19, FT4-1.5\par neg tpo/tg ab\par saw Dr Jalloh- s/p benign of Lt and isthmic nodules (no report is avail). \par \par *** stopped cycloset  b/o  stomach cramps, HA, fatigue \par \par Previously intolerant of metformin. \par failed lantus, toujeo, basaglar. tried tresiba\par \par HPI:\par Has been diagnosed with Hashimoto's thyroiditis and subclinical hyperthyroidism in 1998. Has been followed by Janet Hamm and [facility], but was never placed on antithyroidal medications. \par \par Was also diagnosed with MNG, and underwent a biopsy of her dominant right thyroid nodule by Dr Jalloh, which was benign. \par No history of neck surgery or radiation exposure to the neck area other than radiologic studies.  Family history is negative for thyroid illness. \par Currently, there are no local neck symptoms of anterior neck pain or problems with breathing, speaking, or swallowing.  Patient denies symptoms of hypothyroidism or hyperthyroidism.\par \par Also, with DM2 since 2003. Denies any complications\par recalls most recent a1c in the 8's range\par Lab review: \par \par TSH- 0.25 <-- 0.083\par \par FT4- 1.15\par \par neg tpo/tg antibodies\par \par \par Thyroid US (4/10/18)- large thyroid. Multiple bilateral nodules, including dominant RMP iso complex 2.5x1.5x3.2; LLP hypo 2.6x2.1x2.3; LMP isosolid 2.4x1.1x.2.1, and isthmic solid iso 1.1x0.6x1.2\par \par 24h I-123 thyroid uptake and scan (4/25/18)- "cold nodules in the LLP and thyroid isthmus". Uptake- 15.1%\par \par FNA (11/07/17) of RMP 2.6cm nodule- colloid nodule\par \par \par ********\par \par last cardio - 2019 (Dr Stevens)\par last ST- 2019\par last echo- 2019\par last podiatry - several years ago\par last ophthalmology- 8/21\par

## 2022-09-08 NOTE — ASSESSMENT
[Diabetes Foot Care] : diabetes foot care [Long Term Vascular Complications] : long term vascular complications of diabetes [Carbohydrate Consistent Diet] : carbohydrate consistent diet [Importance of Diet and Exercise] : importance of diet and exercise to improve glycemic control, achieve weight loss and improve cardiovascular health [Exercise/Effect on Glucose] : exercise/effect on glucose [Hypoglycemia Management] : hypoglycemia management [Glucagon Use] : glucagon use [Ketone Testing] : ketone testing [Action and use of Insulin] : action and use of short and long-acting insulin [Self Monitoring of Blood Glucose] : self monitoring of blood glucose [Insulin Self-Administration] : insulin self-administration [Injection Technique, Storage, Sharps Disposal] : injection technique, storage, and sharps disposal [Sick-Day Management] : sick-day management [Retinopathy Screening] : Patient was referred to ophthalmology for retinopathy screening [Diabetic Medications] : Risks and benefits of diabetic medications were discussed [FreeTextEntry1] : - pt declined retrying metformin at this time b/o fear GI problems\par - obtain labs from PCP\par - might retry tresiba once stomach issues resolve\par - resume SMBG. \par - options for insulin pump/ sensor reviewed. different pumps discussed. pt will consider- will give a sample of Dexcom\par - toujeo 76 un hs, farxiga 10mg qd, actos 30 mg qd\par - keep off acarbose\par - labs today\par - again advised GES, and if (+), will stop trulicity. obtain the report of abd MRI. advised that testing (GES)  should be done off trulicity\par - obtain path report from Dr. Jalloh\par - synthroid  88 mcg \par - monitor ca/PTH. will do 24 h Uca\par - crestor 5mg qd. might require combo with zetia\par - cont  fenofibrate 200 mg qd\par - f/u pulmonary (Dr. Lang)\par - advised to f/u with hem/onc re: neutropenia\par RTC  4  mos

## 2022-09-09 ENCOUNTER — TRANSCRIPTION ENCOUNTER (OUTPATIENT)
Age: 67
End: 2022-09-09

## 2022-09-09 LAB
25(OH)D3 SERPL-MCNC: 30 NG/ML
ALBUMIN SERPL ELPH-MCNC: 4.5 G/DL
ALP BLD-CCNC: 54 U/L
ALT SERPL-CCNC: 20 U/L
ANION GAP SERPL CALC-SCNC: 15 MMOL/L
AST SERPL-CCNC: 31 U/L
BILIRUB SERPL-MCNC: 0.3 MG/DL
BUN SERPL-MCNC: 18 MG/DL
C PEPTIDE SERPL-MCNC: 4.6 NG/ML
CALCIUM SERPL-MCNC: 10.2 MG/DL
CALCIUM SERPL-MCNC: 10.2 MG/DL
CHLORIDE SERPL-SCNC: 105 MMOL/L
CHOLEST SERPL-MCNC: 190 MG/DL
CO2 SERPL-SCNC: 24 MMOL/L
CREAT SERPL-MCNC: 0.81 MG/DL
CREAT SPEC-SCNC: 41 MG/DL
EGFR: 80 ML/MIN/1.73M2
ESTIMATED AVERAGE GLUCOSE: 151 MG/DL
FOLATE SERPL-MCNC: 11.5 NG/ML
FRUCTOSAMINE SERPL-MCNC: 246 UMOL/L
GLUCOSE SERPL-MCNC: 115 MG/DL
HBA1C MFR BLD HPLC: 6.9 %
HDLC SERPL-MCNC: 33 MG/DL
LDLC SERPL CALC-MCNC: 100 MG/DL
MICROALBUMIN 24H UR DL<=1MG/L-MCNC: <1.2 MG/DL
MICROALBUMIN/CREAT 24H UR-RTO: NORMAL MG/G
NONHDLC SERPL-MCNC: 157 MG/DL
PARATHYROID HORMONE INTACT: 75 PG/ML
POTASSIUM SERPL-SCNC: 4 MMOL/L
PROT SERPL-MCNC: 6.9 G/DL
SODIUM SERPL-SCNC: 145 MMOL/L
T4 FREE SERPL-MCNC: 1.1 NG/DL
TRIGL SERPL-MCNC: 286 MG/DL
TSH SERPL-ACNC: 2.44 UIU/ML
VIT B12 SERPL-MCNC: 480 PG/ML

## 2022-09-11 LAB — CA-I SERPL-SCNC: 4.4 MG/DL

## 2022-09-13 ENCOUNTER — APPOINTMENT (OUTPATIENT)
Dept: ORTHOPEDIC SURGERY | Facility: CLINIC | Age: 67
End: 2022-09-13

## 2022-09-13 VITALS — WEIGHT: 142 LBS | HEIGHT: 63 IN | BODY MASS INDEX: 25.16 KG/M2

## 2022-09-13 PROCEDURE — 99213 OFFICE O/P EST LOW 20 MIN: CPT | Mod: 57

## 2022-09-14 ENCOUNTER — RX RENEWAL (OUTPATIENT)
Age: 67
End: 2022-09-14

## 2022-09-14 LAB — SEROTONIN SERUM: 37 NG/ML

## 2022-09-14 NOTE — HISTORY OF PRESENT ILLNESS
[2] : 2 [Dull/Aching] : dull/aching [Nothing helps with pain getting better] : Nothing helps with pain getting better [de-identified] : R MF trigger\par  [] : no [FreeTextEntry1] : r hand [FreeTextEntry3] : a while ago [FreeTextEntry5] : here for 2nd opinion. feels a bump [de-identified] : MD

## 2022-09-14 NOTE — PHYSICAL EXAM
[de-identified] : R hand: \par Mild swelling \par Tender 3rd A1 pulley \par Decreased middle ROM \par +middle triggering\par

## 2022-09-14 NOTE — ASSESSMENT
[FreeTextEntry1] : For R MF trigger release (needs forearm tourniquet for lymph node dissection) \par R/B/A of surgery discussed with the patient. Risks including but not limited to infection, nerve damage, tendon damage, pain, stiffness, recurrence, no resolution of symptoms, loss of function, limb or life. They understand and agree to surgery \par Return post op

## 2022-09-15 LAB
PANC POLYPEPT SERPL-MCNC: 234 PG/ML
VIP SERPL-MCNC: <50 PG/ML

## 2022-09-16 ENCOUNTER — TRANSCRIPTION ENCOUNTER (OUTPATIENT)
Age: 67
End: 2022-09-16

## 2022-09-16 LAB
METANEPHRINE, PL: 17 PG/ML
NORMETANEPHRINE, PL: 176.9 PG/ML

## 2022-10-12 ENCOUNTER — RX RENEWAL (OUTPATIENT)
Age: 67
End: 2022-10-12

## 2022-10-12 ENCOUNTER — APPOINTMENT (OUTPATIENT)
Age: 67
End: 2022-10-12

## 2022-10-12 PROCEDURE — 26055 INCISE FINGER TENDON SHEATH: CPT | Mod: F7

## 2022-10-12 PROCEDURE — ZZZZZ: CPT

## 2022-10-18 ENCOUNTER — APPOINTMENT (OUTPATIENT)
Dept: ORTHOPEDIC SURGERY | Facility: CLINIC | Age: 67
End: 2022-10-18

## 2022-10-18 VITALS — WEIGHT: 142 LBS | BODY MASS INDEX: 25.16 KG/M2 | HEIGHT: 63 IN

## 2022-10-18 PROCEDURE — 99024 POSTOP FOLLOW-UP VISIT: CPT

## 2022-10-19 NOTE — ASSESSMENT
[FreeTextEntry1] : Sutures removed\par Steris applied\par LIght activities and advance as tolerated\par Therapy\par Return in 3 weeks

## 2022-10-19 NOTE — PHYSICAL EXAM
[de-identified] : Mild hand swelling\par Healed incision\par No evidence of infection\par Mild tenderness at the surgical site\par Good finger ROM \par No triggering

## 2022-10-19 NOTE — HISTORY OF PRESENT ILLNESS
[8] : 8 [6] : 6 [Dull/Aching] : dull/aching [] : Post Surgical Visit: yes [de-identified] : R MF trigger release last week\par She is doing well  [FreeTextEntry1] :  right MF  [de-identified] : no [de-identified] : 10/12/22 [de-identified] : right MF trigger release

## 2022-10-25 ENCOUNTER — APPOINTMENT (OUTPATIENT)
Dept: ORTHOPEDIC SURGERY | Facility: CLINIC | Age: 67
End: 2022-10-25
Payer: MEDICARE

## 2022-10-25 VITALS — WEIGHT: 142 LBS | BODY MASS INDEX: 25.16 KG/M2 | HEIGHT: 63 IN

## 2022-10-25 PROCEDURE — J3490M: CUSTOM

## 2022-10-25 PROCEDURE — 99214 OFFICE O/P EST MOD 30 MIN: CPT | Mod: 25

## 2022-10-25 PROCEDURE — 20611 DRAIN/INJ JOINT/BURSA W/US: CPT | Mod: 79,LT

## 2022-10-25 NOTE — PROCEDURE
[Large Joint Injection] : Large joint injection [Left] : of the left [Knee] : knee [Inflammation] : inflammation [X-ray evidence of Osteoarthritis on this or prior visit] : x-ray evidence of Osteoarthritis on this or prior visit [Betadine] : betadine [Ethyl Chloride sprayed topically] : ethyl chloride sprayed topically [Sterile technique used] : sterile technique used [___ cc    0.25%] : Bupivacaine (Marcaine) ~Vcc of 0.25%  [___ cc    32 units 5mg] : Zilretta ~Vcc of 32 units 5 mg  [Call if redness, pain or fever occur] : call if redness, pain or fever occur [] : Patient tolerated procedure well [Apply ice for 15min out of every hour for the next 12-24 hours as tolerated] : apply ice for 15 minutes out of every hour for the next 12-24 hours as tolerated [Previous OTC use and PT nontherapeutic] : patient has tried OTC's including aspirin, Ibuprofen, Aleve, etc or prescription NSAIDS, and/or exercises at home and/or physical therapy without satisfactory response [Risks, benefits, alternatives discussed / Verbal consent obtained] : the risks benefits, and alternatives have been discussed, and verbal consent was obtained [Precise injection in area of tear] : precise injection in area of tear [All ultrasound images have been permanently captured and stored accordingly in our picture archiving and communication system] : All ultrasound images have been permanently captured and stored accordingly in our picture archiving and communication system [Visualization of the needle and placement of injection was performed without complication] : visualization of the needle and placement of injection was performed without complication [Pain] : pain

## 2022-10-26 NOTE — HISTORY OF PRESENT ILLNESS
[de-identified] : Patient is here for follow up of left knee. Pain has not improved since last visit. Patient has been taking Tylenol for the pain, has done no further treatment otherwise since last visit. Pain is worst at night when she sleeps and after long walks. \par had zilretta in july helped until recently  is nto a cadidate for a replacement

## 2022-10-26 NOTE — DISCUSSION/SUMMARY
[Medication Risks Reviewed] : Medication risks reviewed [Surgical risks reviewed] : Surgical risks reviewed [de-identified] : discussed risks of surgical treatment and nonsurgical treatment of arthritis, discussed risks of steroid injection plus or minus viscosupplementation, risks of zilretta and benefits, role of surgery and mri, risks and role of nsaids and side effects, benefits of therapy\par \par \par evaluated knee and decided to proceed with zilretta injections, discussed future treatment and option, will proceed, discussed possible need for surgery vs alternatives in future\par The risks, benefits and contents of the injection have been discussed.  Risks include but are not limited to allergic reaction, flare reaction, permanent white skin discoloration at the injection site and infection.  The patient understands the risks and agrees to having the injection.  All questions have been answered.\par \par Zilretta LT knee Discussed the timing of the injections and the follow up that is needed. Advised the pt to ice the area that was injected and informed the pt it may take a few days for the injection to give relief.\par \par right knee with occasional pain but otherwise no intervention need at this time, multiple medical issues argue against risks of total knee at this time

## 2022-10-26 NOTE — REVIEW OF SYSTEMS
Called and spoke to patient, explained the lab request is going to be with [facility] and not [facility] now. Gave her the address [facility]   and said she didn't need a copy go the request to go in. Patient expressed understanding of information given and had no further questions at this time. [Joint Pain] : joint pain [Joint Stiffness] : joint stiffness [Joint Swelling] : no joint swelling

## 2022-11-08 ENCOUNTER — APPOINTMENT (OUTPATIENT)
Dept: ORTHOPEDIC SURGERY | Facility: CLINIC | Age: 67
End: 2022-11-08
Payer: MEDICARE

## 2022-11-08 VITALS — HEIGHT: 63 IN | WEIGHT: 142 LBS | BODY MASS INDEX: 25.16 KG/M2

## 2022-11-08 PROCEDURE — 20550 NJX 1 TENDON SHEATH/LIGAMENT: CPT | Mod: 58,RT

## 2022-11-08 PROCEDURE — J3490M: CUSTOM

## 2022-11-08 PROCEDURE — 99024 POSTOP FOLLOW-UP VISIT: CPT

## 2022-11-08 NOTE — ASSESSMENT
[FreeTextEntry1] : Middle finger flexor tendon injection was performed because of pain inflammation and stiffness\par Anesthesia: ethyl chloride sprayed topically\par Celestone: An injection of Celestone 1cc\par Lidocaine: An injection of Lidocaine 1% 1cc\par Marcaine: An injection of Marcaine 0.5% 1cc\par \par Patient has tried OTC's including aspirin, Ibuprofen, Aleve etc or prescription NSAIDS, and/or exercises at home and/ or\par physical therapy without satisfactory response.\par After verbal consent using sterile preparation and technique. The risks, benefits, and alternatives to cortisone injection\par were explained in full to the patient. Risks outlined include but are not limited to infection, sepsis, bleeding, scarring, skin\par discoloration, temporary increase in pain, syncopal episode, failure to resolve symptoms, allergic reaction, symptom\par recurrence, and elevation of blood sugar in diabetics. Patient understood the risks. All questions were answered. After\par discussion of options, patient requested an injection. Oral informed consent was obtained and sterile prep was done of the\par injection site. Sterile technique was utilized for the procedure including the preparation of the solutions used for the\par injection. Patient tolerated the procedure well. Advised to ice the injection site this evening.\par Prep with betadine locally to site. Sterile technique used

## 2022-11-08 NOTE — HISTORY OF PRESENT ILLNESS
[10] : 10 [9] : 9 [Dull/Aching] : dull/aching [] : Post Surgical Visit: yes [de-identified] : R MF trigger release 3 weeks ago\par still has pain and swelling  [FreeTextEntry1] : ally QUINONES [de-identified] : ice [de-identified] : 10/12/22 [de-identified] : right MF trigger release

## 2022-11-08 NOTE — PHYSICAL EXAM
[de-identified] : Mild hand swelling\par Healed incision\par No evidence of infection\par Mild tenderness at the surgical site\par MFP s stiff in full ext \par No triggering

## 2022-11-10 ENCOUNTER — RX RENEWAL (OUTPATIENT)
Age: 67
End: 2022-11-10

## 2022-11-20 ENCOUNTER — NON-APPOINTMENT (OUTPATIENT)
Age: 67
End: 2022-11-20

## 2022-12-04 ENCOUNTER — TRANSCRIPTION ENCOUNTER (OUTPATIENT)
Age: 67
End: 2022-12-04

## 2022-12-07 ENCOUNTER — APPOINTMENT (OUTPATIENT)
Dept: ENDOCRINOLOGY | Facility: CLINIC | Age: 67
End: 2022-12-07

## 2022-12-07 VITALS
DIASTOLIC BLOOD PRESSURE: 74 MMHG | SYSTOLIC BLOOD PRESSURE: 133 MMHG | OXYGEN SATURATION: 97 % | HEART RATE: 83 BPM | WEIGHT: 144 LBS | BODY MASS INDEX: 25.52 KG/M2 | HEIGHT: 63 IN

## 2022-12-07 LAB — GLUCOSE BLDC GLUCOMTR-MCNC: 131

## 2022-12-07 PROCEDURE — 82962 GLUCOSE BLOOD TEST: CPT

## 2022-12-07 PROCEDURE — 99214 OFFICE O/P EST MOD 30 MIN: CPT | Mod: 25

## 2022-12-07 RX ORDER — RAMIPRIL 5 MG/1
5 CAPSULE ORAL
Refills: 0 | Status: DISCONTINUED | COMMUNITY
End: 2022-12-07

## 2022-12-07 RX ORDER — BLOOD-GLUCOSE METER
EACH MISCELLANEOUS
Qty: 15 | Refills: 3 | Status: ACTIVE | COMMUNITY
Start: 2022-12-07 | End: 1900-01-01

## 2022-12-07 RX ORDER — BLOOD-GLUCOSE METER
EACH MISCELLANEOUS
Qty: 1 | Refills: 0 | Status: ACTIVE | COMMUNITY
Start: 2022-12-07 | End: 1900-01-01

## 2022-12-07 NOTE — ASSESSMENT
[Diabetes Foot Care] : diabetes foot care [Long Term Vascular Complications] : long term vascular complications of diabetes [Carbohydrate Consistent Diet] : carbohydrate consistent diet [Importance of Diet and Exercise] : importance of diet and exercise to improve glycemic control, achieve weight loss and improve cardiovascular health [Exercise/Effect on Glucose] : exercise/effect on glucose [Hypoglycemia Management] : hypoglycemia management [Glucagon Use] : glucagon use [Ketone Testing] : ketone testing [Action and use of Insulin] : action and use of short and long-acting insulin [Self Monitoring of Blood Glucose] : self monitoring of blood glucose [Insulin Self-Administration] : insulin self-administration [Injection Technique, Storage, Sharps Disposal] : injection technique, storage, and sharps disposal [Sick-Day Management] : sick-day management [Retinopathy Screening] : Patient was referred to ophthalmology for retinopathy screening [Diabetic Medications] : Risks and benefits of diabetic medications were discussed [FreeTextEntry1] : 1. T2DM\par - pt prev declined retrying metformin at this time b/o fear GI problems, but I would wait now in view of recent DKA\par - also, will stop Farxiga for now. would avoid SGLT2i given her recent ketoacidosis. I'd like to see hospital records\par - Naomie 2\par - options for insulin pump/ sensor reviewed. different pumps discussed. pt will consider- will give a sample of Dexcom. \par - for now continue toujeo 76 un hs,  actos 30 mg qd, Trulicity 1.5 mg qw\par - keep off acarbose\par - labs today\par - again advised GES, and if (+), will stop trulicity. obtain the report of abd MRI. advised that testing (GES)  should be done off trulicity\par - crestor 5mg qd. might require combo with zetia\par - cont  fenofibrate 200 mg qd\par \par 2. Post-surgical hypothyroidism\par - obtain path report from Dr. Jalloh\par - synthroid  88 mcg \par - monitor ca/PTH. will do 24 h Uca\par - f/u pulmonary (Dr. Lang)\par - advised to f/u with hem/onc re: neutropenia\par RTC 3  mos

## 2022-12-07 NOTE — HISTORY OF PRESENT ILLNESS
[FreeTextEntry1] : 68 yo female f/u  for management of  DM2 and thyroid nodules. \par \par *** Dec 07, 2022 ***\par \par admitted with viral sepsis, cellulitis and shingles,  dehydration about 3 wks ago. apparently with DKA on admission. Stayed there for 4 days, on IV insulin, hydration\par discharged on prior regimen\par taking  toujeo 76 un hs, farxiga 10mg qd, actos 30 mg qd, Trulicity 1.5 gm qw\par labs from  11/29/22- bicarb- 24, ast/alt- 23/19, K- 4.3,  creat- 0.66, a1c- 6.6, f/amine- 254, ca- 10.5, iCa- 1.1 (0.9-1.3)\par labs from [facility] ( last day, at the discharge)- K- 3.3, co2- 29, neg ketones\par not checking FS- meter is broken. Prior was checking 4x day. wants to start sensors\par \par *** Sep 08, 2022 ***\par \par had covid in 05/22. still with a post-covid fatigue. \par lost weight. new "wet" hot flashes \par taking toujeo 76 un hs, farxiga 10mg qd,  actos 30 mg qd,  fenofibrate 200mg, crestor 5 mg\par resumed trulcity 1.5 mg qw\par PCP lowered  synthroid to 88 mcg last month\par not checking her FS\par \par *** Jan 26, 2022 ***\par \par feels ok. still with a back pain related to LS. getting steroid injections\par seeing another GI\par still no GES . stopped trulicity x 3 weeks to see if there is any help with her stomach issues, but there was no relief\par \par on  toujeo 76 un hs, trulicity 1.5 qw, farxiga 10mg qd, fenofibrate 200mg, crestor 5 mg, synthroid 100 mcg\par labs done 2 days ago at PCP- results are pending\par per pt, was told a1c - 6.1, and TFT's were fine\par reports fbs and ppg-  low 100's\par denies hypo's\par \par \par *** Aug 09, 2021 ***\par \par on  toujeo 76 un hs, trulicity 1.5 qw, farxiga 10mg qd, fenofibrate 200mg, crestor 5 mg, synthroid 100 mcg\par labs from 8/2/21- a1c- 7.4, TG- 213, LDL- 57, 25D- 40, TSH- 0.53, ca- 10.0,  PTH- 113\par reports DXA dome with her rheumatologist 2 months ago- reportedly stable, not on ART\par stopped checking FS\par still with stomach issues. did not do GES yet and did not try stopping Trulicity  yet\par \par *** Apr 08, 2021 ***\par \par on  toujeo 74 un hs, trulicity 1.5 qw, farxiga 10mg qd, fenofibrate 200mg, crestor 5 mg, synthroid 100 mcg\par reports fbs- under 120, ppg-not checking\par never proceeded with GES\par with worsening acid reflux. planning for EGD this month. otherwise, feels well\par \par labs (4/2/21)- TSH- 1.0, TG- 215, LDL- 82, a1c- 6.7, ca- 9.6, PTH-80\par \par *** Aug 18, 2020 ***\par \par on  toujeo 74 un hs, trulicity 1.5 qw, farxiga 10mg qd, fenofibrate 200mg, synthroid 100 mcg\par feels more tired, poss related to the radiation tx.\par hot flashes on letrozole\par reports fbs- 142-162, not checking ppg\par she stopped white bread and fruits within past 2 weeks- FS are much better\par today fbs- 116\par labs from 8/14/20- a1c- 7.3, TG- 196, LDL- 79, TSH- 2.08\par WBC- 2.67 with low ANC\par \par *** May 18, 2020 ***\par \par diagnosed with breast cancer. S/p lumpectomy. planned for XRT and hormonal therapy\par On toujeo 70 un hs, trulicity 1.5 qw, farxiga 10mg qd, fenofibrate  134 mg, Synthroid 112 mcg\par not taking crestor\par reports fbs- 120's- 130's, ppg- 160's\par \par *** Feb 11, 2020 ***\par \par on toujeo 70 un hs, trulicity 1.5 qw, farxiga 10mg qd\par not taking crestor b/o generalized myalgia\par TSH- 1.14\par TG-419\par a1c- 6.4\par \par no log, reports fbs- 140's, ppg- 160's\par today fbs in the office- 188\par denies hypo's\par \par \par \par *** Nov 13, 2019 ***\par recovering from cold. still on levaquin\par on toujeo 70 un hs, trulicity 1.5 qw, farxiga 10mg qd, synthroid 112 mcg, crestor 5mg qw (feels fine on this dose) \par not taking zetia\par no GES yet\par a1c- 6.7, iCa- 5.6, TSH- 1.9, ca- 10.0\par no Lipid panel done\par \par *** Aug 13, 2019 ***\par \par on  toujeo 70 un hs, trulicity 1.5 qw, farxiga 10mg qd,  synthroid  112 mcg, ramipril\par off HCTZ\par stopped crestor b/o myalgia. was rx'ed zetia\par off acarbose b/o GI issues\par did not do GES yet\par had an MRI abdomen done- reports normal results\par a1c- 6.3, f/am-272\par TSH- 1.09, FT4- 1.3, LDL- 122, TG- 289\par ca- 10.5\par no log, reports FBS- low 110's, ppg- upto 149\par \par *** May 29, 2019 ***\par \par s/p total tx (9/25/18). \par feels well. lost 3 lbs\par on  toujeo 70 un hs, trulicity 1.5 qw, farxiga 10mg qd,  acarbose 50mg with dinner (freq forgets to take it),  synthroid 112mcg\par - stopped fiasp b/o "stomach aches"\par off HCTZ b/o elevated calcium and noticed that BS is higher since then\par saw Dr. Crocker, had an EGD. still with bloating/ diarrhea. Per pt, symptoms started way prior to trulicity\par recalls 2 GES several yrs ago with equivocal results\par \par TSH- 1.3\par a1c- 6.4 <-- 6.6 <-- 8.6 <-- 8.2\par f/amine-265 <--  258\par g/queenie- 1.2\par \par - ca/PTH/D- nl\par reports FBS-  138- 202 , p/D- 150's- 160's. no more nocturnal hypo's\par \par FNA (7/20/18)- of LLP 2.6- benign follic nodule (Ramonita II)\par Thyr US (7/11/18)- multiple b/l nodules,incl dominant RMP 2.4, LMP 2.6 + 2.1, LLP 2.6.  All are stable\par \par 1mg ONDST- 1.6\par TSH- 0.19, FT4-1.5\par neg tpo/tg ab\par saw Dr Jalloh- s/p benign of Lt and isthmic nodules (no report is avail). \par \par *** stopped cycloset  b/o  stomach cramps, HA, fatigue \par \par Previously intolerant of metformin. \par failed lantus, toujeo, basaglar. tried tresiba\par \par HPI:\par Has been diagnosed with Hashimoto's thyroiditis and subclinical hyperthyroidism in 1998. Has been followed by Janet Hamm and [facility], but was never placed on antithyroidal medications. \par \par Was also diagnosed with MNG, and underwent a biopsy of her dominant right thyroid nodule by Dr Jalloh, which was benign. \par No history of neck surgery or radiation exposure to the neck area other than radiologic studies.  Family history is negative for thyroid illness. \par Currently, there are no local neck symptoms of anterior neck pain or problems with breathing, speaking, or swallowing.  Patient denies symptoms of hypothyroidism or hyperthyroidism.\par \par Also, with DM2 since 2003. Denies any complications\par recalls most recent a1c in the 8's range\par Lab review: \par \par TSH- 0.25 <-- 0.083\par \par FT4- 1.15\par \par neg tpo/tg antibodies\par \par \par Thyroid US (4/10/18)- large thyroid. Multiple bilateral nodules, including dominant RMP iso complex 2.5x1.5x3.2; LLP hypo 2.6x2.1x2.3; LMP isosolid 2.4x1.1x.2.1, and isthmic solid iso 1.1x0.6x1.2\par \par 24h I-123 thyroid uptake and scan (4/25/18)- "cold nodules in the LLP and thyroid isthmus". Uptake- 15.1%\par \par FNA (11/07/17) of RMP 2.6cm nodule- colloid nodule\par \par \par ********\par \par last cardio - 2019 (Dr Stevens)\par last ST- 2019\par last echo- 2019\par last podiatry - several years ago\par last ophthalmology- 8/21\par

## 2022-12-13 ENCOUNTER — APPOINTMENT (OUTPATIENT)
Dept: ORTHOPEDIC SURGERY | Facility: CLINIC | Age: 67
End: 2022-12-13

## 2022-12-13 VITALS — HEIGHT: 63 IN | BODY MASS INDEX: 25.52 KG/M2 | WEIGHT: 144 LBS

## 2022-12-13 DIAGNOSIS — M06.9 RHEUMATOID ARTHRITIS, UNSPECIFIED: ICD-10-CM

## 2022-12-13 PROCEDURE — 99024 POSTOP FOLLOW-UP VISIT: CPT

## 2022-12-13 NOTE — HISTORY OF PRESENT ILLNESS
[7] : 7 [6] : 6 [Dull/Aching] : dull/aching [] : Post Surgical Visit: yes [de-identified] : R MF trigger release\par She was better with injection last visit for a few days [FreeTextEntry1] : KELSEY QUINONES [de-identified] : splint [de-identified] : 10/12/22 [de-identified] : right MF trigger release

## 2022-12-13 NOTE — PHYSICAL EXAM
[de-identified] : Mild hand swelling\par Healed incision\par No evidence of infection\par Mild tenderness at the surgical site\par MF stiff in full ext \par No triggering

## 2022-12-13 NOTE — ASSESSMENT
[FreeTextEntry1] : For R MF flexor tenolysis \par R/B/A of surgery discussed with the patient. Risks including but not limited to infection, nerve damage, tendon damage, pain, stiffness, recurrence, no resolution of symptoms, loss of function, limb or life. They understand and agree to surgery \par Return post op

## 2023-01-04 ENCOUNTER — APPOINTMENT (OUTPATIENT)
Dept: PAIN MANAGEMENT | Facility: CLINIC | Age: 68
End: 2023-01-04

## 2023-01-11 ENCOUNTER — TRANSCRIPTION ENCOUNTER (OUTPATIENT)
Age: 68
End: 2023-01-11

## 2023-01-12 ENCOUNTER — TRANSCRIPTION ENCOUNTER (OUTPATIENT)
Age: 68
End: 2023-01-12

## 2023-01-17 ENCOUNTER — APPOINTMENT (OUTPATIENT)
Dept: ORTHOPEDIC SURGERY | Facility: CLINIC | Age: 68
End: 2023-01-17
Payer: MEDICARE

## 2023-01-17 VITALS — WEIGHT: 144 LBS | BODY MASS INDEX: 25.52 KG/M2 | HEIGHT: 63 IN

## 2023-01-17 PROCEDURE — 73562 X-RAY EXAM OF KNEE 3: CPT | Mod: RT

## 2023-01-17 PROCEDURE — 99214 OFFICE O/P EST MOD 30 MIN: CPT

## 2023-01-17 RX ORDER — MELOXICAM 15 MG/1
15 TABLET ORAL DAILY
Qty: 30 | Refills: 1 | Status: ACTIVE | COMMUNITY
Start: 2023-01-17 | End: 1900-01-01

## 2023-01-18 ENCOUNTER — APPOINTMENT (OUTPATIENT)
Age: 68
End: 2023-01-18
Payer: MEDICARE

## 2023-01-18 PROCEDURE — 26440 RELEASE PALM/FINGER TENDON: CPT | Mod: F7

## 2023-01-18 NOTE — DISCUSSION/SUMMARY
[Medication Risks Reviewed] : Medication risks reviewed [Surgical risks reviewed] : Surgical risks reviewed [de-identified] : Right knee pain and popping. Xrays reviewed are stable. Cyst on xray reviewed with patient. recommend mri to rule out infection, eval for AVN loosening and further guide treatment, follow up immediately after mri. prescribed mobic and discussed risks of side effects and timing and management of medication.  side effects can include gi ulcers and irritation as well as kidney failure and bleeding issues \par discussed risks and high morbidity of revision knee replacement and would try to avoid at all costs\par The risks and benefits of surgery have been discussed. Risks include but are not limited to bleeding, infection, reaction to anesthesia, injury to blood vessels and nerves, malunion, nonunion, DVT, PE, necessity of repeat surgery, chronic pain, loss of limb and death. The patient understands the risks and has chosen to proceed with conservative care. All questions have been answered.\par

## 2023-01-18 NOTE — HISTORY OF PRESENT ILLNESS
[de-identified] : Here for pain in the right knee since Friday. Patient states that she was cleaning and felt a pulling sensation in the knee and had intense pain. She had TKA on the knee about 9 years ago and was doing well until this issue. Since Friday the pain has gotten better.  some clicking to the knee

## 2023-01-18 NOTE — PHYSICAL EXAM
[Right] : right knee [All Views] : anteroposterior, lateral, skyline, and anteroposterior standing [No loss of surgical correlation. Bony alignment acceptable. Hardware in appropriate position] : No loss of surgical correlation. Bony alignment acceptable. Hardware in appropriate position [4___] : quadriceps 4[unfilled]/5 [5___] : hamstring 5[unfilled]/5 [] : non-antalgic [TWNoteComboBox7] : flexion 125 degrees

## 2023-01-19 ENCOUNTER — FORM ENCOUNTER (OUTPATIENT)
Age: 68
End: 2023-01-19

## 2023-01-20 ENCOUNTER — APPOINTMENT (OUTPATIENT)
Dept: MRI IMAGING | Facility: CLINIC | Age: 68
End: 2023-01-20
Payer: MEDICARE

## 2023-01-20 PROCEDURE — 73721 MRI JNT OF LWR EXTRE W/O DYE: CPT | Mod: RT,MH

## 2023-01-24 ENCOUNTER — APPOINTMENT (OUTPATIENT)
Dept: ORTHOPEDIC SURGERY | Facility: CLINIC | Age: 68
End: 2023-01-24
Payer: MEDICARE

## 2023-01-24 VITALS — HEIGHT: 63 IN | BODY MASS INDEX: 25.52 KG/M2 | WEIGHT: 144 LBS

## 2023-01-24 PROCEDURE — 99024 POSTOP FOLLOW-UP VISIT: CPT

## 2023-01-24 NOTE — ASSESSMENT
[FreeTextEntry1] : Sutures removed\par Steris applied\par LIght activities and advance as tolerated\par Therapy\par Return in 2 weeks

## 2023-01-24 NOTE — PHYSICAL EXAM
[de-identified] : Mild hand swelling\par Healed incision\par No evidence of infection\par Mild tenderness at the surgical site\par Good finger ROM\par No triggering

## 2023-01-24 NOTE — HISTORY OF PRESENT ILLNESS
[3] : 3 [2] : 2 [] : Post Surgical Visit: yes [de-identified] : R MF flexor tenolysis\par Doing well [FreeTextEntry1] : right hand [de-identified] : 1/18/23 [de-identified] : right MF flexor tenolysis

## 2023-01-30 ENCOUNTER — APPOINTMENT (OUTPATIENT)
Dept: ORTHOPEDIC SURGERY | Facility: CLINIC | Age: 68
End: 2023-01-30
Payer: MEDICARE

## 2023-01-30 VITALS — WEIGHT: 144 LBS | BODY MASS INDEX: 25.52 KG/M2 | HEIGHT: 63 IN

## 2023-01-30 PROCEDURE — 20611 DRAIN/INJ JOINT/BURSA W/US: CPT | Mod: 79,RT

## 2023-01-30 PROCEDURE — 73564 X-RAY EXAM KNEE 4 OR MORE: CPT | Mod: LT

## 2023-01-30 PROCEDURE — 99214 OFFICE O/P EST MOD 30 MIN: CPT | Mod: 25

## 2023-01-30 PROCEDURE — J3490M: CUSTOM | Mod: NC

## 2023-01-31 ENCOUNTER — APPOINTMENT (OUTPATIENT)
Dept: ORTHOPEDIC SURGERY | Facility: CLINIC | Age: 68
End: 2023-01-31

## 2023-01-31 NOTE — PHYSICAL EXAM
[Right] : right knee [4___] : quadriceps 4[unfilled]/5 [NL (0)] : extension 0 degrees [5___] : quadriceps 5[unfilled]/5 [Negative] : negative Meseret's [Left] : left knee [Advanced patellofemoral OA] : Advanced patellofemoral OA [] : no extensor lag [TWNoteComboBox7] : flexion 125 degrees

## 2023-01-31 NOTE — DISCUSSION/SUMMARY
[Medication Risks Reviewed] : Medication risks reviewed [Surgical risks reviewed] : Surgical risks reviewed [de-identified] : Reviewed MRI right knee results; without gross signs of osteolysis or loosening. Will eval with bone scan as suggested by radiologist for further evaluation\par Also known left knee djd, with recent worsening pain. \par Discussed risks of potential surgery. However, due to the risks of the surgery, we will try NSAIDs and therapy. Discussed management of medication.\par \par Will inject left knee with Zilretta today. \par evaluated knee and decided to proceed with zilretta injections, discussed future treatment and option, will proceed, discussed possible need for surgery vs alternatives in future\par The risks, benefits and contents of the injection have been discussed.  Risks include but are not limited to allergic reaction, flare reaction, permanent white skin discoloration at the injection site and infection.  The patient understands the risks and agrees to having the injection.  All questions have been answered.\par \par unclear where pain in knee is coming from, recommend bone scan which is more sensitive for loosening , any loosening would require revision which is high risk procedure with high morbidity\par \par

## 2023-01-31 NOTE — DATA REVIEWED
[MRI] : MRI [Right] : of the right [Knee] : knee [Report was reviewed and noted in the chart] : The report was reviewed and noted in the chart [I independently reviewed and interpreted images and report] : I independently reviewed and interpreted images and report [I reviewed the films/CD] : I reviewed the films/CD [FreeTextEntry1] : no gross evidence of osteolysis or loosening hardware

## 2023-01-31 NOTE — PROCEDURE
[FreeTextEntry3] : Large joint injection was performed of the left. The indication for this procedure was pain, inflammation and x-ray evidence of Osteoarthritis on this or prior visit. The site was prepped with betadine, ethyl chloride sprayed topically and sterile technique used. An injection of Zilretta cc of 32 unites 5mg was used.\par \par Patient was advised to call if redness, pain or fever occur and apply ice for 15 minutes out of every hour for the next 12-24 hours as tolerated.\par \par Patient has tried OTC's including aspirin, Ibuprofen, Aleve etc, or prescription NSAIDS, and/or exercises at home and/or physical therapy without satisfactory response, patient had decreased mobility in the joint and the risks, benefits and alternatives have been discussed, and verbal consent was obtained.\par

## 2023-01-31 NOTE — HISTORY OF PRESENT ILLNESS
[de-identified] : Patient is here for a follow up appointment to review MRI results for the right knee. Patient states that pain has improved since the previous visit. Patient notes she still feels a little pulling in the knee. Patient states that use of Advil helps with the pain. Patient notes there is still clicking in the knee.

## 2023-02-09 ENCOUNTER — APPOINTMENT (OUTPATIENT)
Dept: PAIN MANAGEMENT | Facility: CLINIC | Age: 68
End: 2023-02-09
Payer: MEDICARE

## 2023-02-09 VITALS — HEIGHT: 63 IN | WEIGHT: 147 LBS | BODY MASS INDEX: 26.05 KG/M2

## 2023-02-09 PROCEDURE — 99214 OFFICE O/P EST MOD 30 MIN: CPT

## 2023-02-09 NOTE — ASSESSMENT
[FreeTextEntry1] : prior BL TFESI 70% relief of pain improved rom and adls sustained several months\par

## 2023-02-09 NOTE — PHYSICAL EXAM
[de-identified] : PHYSICAL EXAM\par \par Constitutional: \par Appears well, no apparent distress\par Ability to communicate: Normal\par Respiratory: non-labored breathing\par Skin: no rash noted\par Head: normocephalic, atraumatic\par Neck: no visible thyroid enlargement\par Eyes: extraocular movements intact\par Neurologic: alert and oriented x3\par Psychiatric: normal mood, affect, and behavior\par \par Lumbar Spine: \par Palpation: left and right lumbar paraspinal spasm and left and right lumbar paraspinal tenderness to palpation.\par ROM: Diminished range of motion in all plains.  Patient notes pain with lateral bending to the left and right.\par MMT: Motor exam is 5/5 through out bilateral lower extremities.\par Sensation: Light touch and pain is intact throughout bilateral lower extremities.\par Reflexes: achilles and patella reflexes are intact and  symmetrical.  No sustained clonus.\par Special Testing: Positive straight leg raise on the left and right side.\par \par Assessemnt:\par Radiculopathy of lumbosacral region (M54.17)\par Myalgia (M79.10)\par \par Plan:\par After discussing various treatment options with the patient including but not limited to oral medications, physical therapy, exercise modalities as well as interventional spinal injections, we have decided with the following plan:\par The patient is presenting with acute/sub-acute radicular pain with impairment in ADLs and functionality.  The pain has not responded to conservative care including NSAID therapy and/or physical therapy.  There is no bleeding tendency, unstable medical condition, or systemic infection\par \par The risks, benefits and alternatives of the proposed procedure were explained in detail with the patient.  The risks outlined include but are not limited to infection, bleeding, post dural puncture headache, nerve injury, a temporary increase in pain, failure to resolve symptoms, allergic reaction, symptom recurrence, and possible elevation of blood sugar.  All questions were answered to patient's satisfaction and he/she verbalized an understanding.\par \par Follow up 1-2 weeks post injection foe re-evaluation.\par \par Continue home exercises, stretching, activity modification, physical therapy, and conservative care.\par \par \par \par

## 2023-02-09 NOTE — DISCUSSION/SUMMARY
[Surgical risks reviewed] : Surgical risks reviewed [de-identified] : proceed BL L5-S1 TFESI \par \par \par After discussing various treatment options with the patient including but not limited to oral medications, physical therapy, exercise,\par modalities as well as interventional spinal injections, we have decided with the following plan\par \par I personally reviewed the MRI/CT scan images and agree with the radiologist's report. The\par radiological findings were discussed with the patient.\par \par \par The risks, benefits, contents and alternatives to injection were explained in full to the patient. Risks outlined include but are not\par limited to infection,sepsis, bleeding, post-dural puncture headache, nerve damage, temporary increase in pain, syncopal episode,\par failure to resolve symptoms, allergic reaction, symptom recurrence, and elevation of blood sugar in diabetics. Cortisone may cause\par immunosuppression. Patient understands the risks. All questions were answered. After discussion of options, patient requested\par an injection. Information regarding the injection was given to the patient. Which medications to stop prior to the injection was\par explained to the patient as well.\par \par Follow up in 1-2 weeks post injection for re-evaluation.\par \par  Conservative Care\par Continue Home exercises, stretching, activity modification, physical therapy, and conservative care.\par

## 2023-02-09 NOTE — HISTORY OF PRESENT ILLNESS
[Lower back] : lower back [7] : 7 [9] : 9 [Stabbing] : stabbing [Constant] : constant [Household chores] : household chores [Sleep] : sleep [Sitting] : sitting [Walking/activity] : walking/activity [Lying in bed] : lying in bed [] : no [FreeTextEntry7] : b/l legs

## 2023-02-14 ENCOUNTER — APPOINTMENT (OUTPATIENT)
Dept: ORTHOPEDIC SURGERY | Facility: CLINIC | Age: 68
End: 2023-02-14
Payer: MEDICARE

## 2023-02-14 VITALS — WEIGHT: 147 LBS | HEIGHT: 63 IN | BODY MASS INDEX: 26.05 KG/M2

## 2023-02-14 DIAGNOSIS — M65.331 TRIGGER FINGER, RIGHT MIDDLE FINGER: ICD-10-CM

## 2023-02-14 PROCEDURE — 99024 POSTOP FOLLOW-UP VISIT: CPT

## 2023-02-14 NOTE — HISTORY OF PRESENT ILLNESS
[3] : 3 [2] : 2 [Dull/Aching] : dull/aching [] : Post Surgical Visit: yes [de-identified] : R MF flex tenolysis\par She is doing well [FreeTextEntry1] : right MF  [FreeTextEntry5] : pain is the same\par  [de-identified] : therapy [de-identified] : 1/18/23 [de-identified] : right MF flexor tenolysis

## 2023-02-15 ENCOUNTER — NON-APPOINTMENT (OUTPATIENT)
Age: 68
End: 2023-02-15

## 2023-02-21 ENCOUNTER — APPOINTMENT (OUTPATIENT)
Age: 68
End: 2023-02-21

## 2023-02-24 ENCOUNTER — NON-APPOINTMENT (OUTPATIENT)
Age: 68
End: 2023-02-24

## 2023-03-02 ENCOUNTER — LABORATORY RESULT (OUTPATIENT)
Age: 68
End: 2023-03-02

## 2023-03-07 ENCOUNTER — APPOINTMENT (OUTPATIENT)
Age: 68
End: 2023-03-07
Payer: MEDICARE

## 2023-03-07 PROCEDURE — 64483 NJX AA&/STRD TFRM EPI L/S 1: CPT | Mod: RT

## 2023-03-09 ENCOUNTER — APPOINTMENT (OUTPATIENT)
Dept: PAIN MANAGEMENT | Facility: CLINIC | Age: 68
End: 2023-03-09

## 2023-03-17 ENCOUNTER — TRANSCRIPTION ENCOUNTER (OUTPATIENT)
Age: 68
End: 2023-03-17

## 2023-03-23 ENCOUNTER — APPOINTMENT (OUTPATIENT)
Dept: PAIN MANAGEMENT | Facility: CLINIC | Age: 68
End: 2023-03-23
Payer: MEDICARE

## 2023-03-23 VITALS — HEIGHT: 63 IN | WEIGHT: 147 LBS | BODY MASS INDEX: 26.05 KG/M2

## 2023-03-23 PROCEDURE — 99214 OFFICE O/P EST MOD 30 MIN: CPT

## 2023-03-23 NOTE — DISCUSSION/SUMMARY
[Surgical risks reviewed] : Surgical risks reviewed [de-identified] : proceed L L4-5 L5-S1 TFESI\par \par After discussing various treatment options with the patient including but not limited to oral medications, physical therapy, exercise, modalities as well as interventional spinal injections, we have decided with the following plan:\par I personally reviewed the MRI/CT scan images and agree with the radiologist's report. The radiological findings were discussed with the patient.\par The risks, benefits, contents and alternatives to injection were explained in full to the patient. Risks outlined include but are not limited to infection,sepsis, bleeding, post-dural puncture headache, nerve damage, temporary increase in pain, syncopal episode, failure to resolve symptoms, allergic reaction, symptom recurrence, and elevation of blood sugar in diabetics. Cortisone may cause immunosuppression. Patient understands the risks. All questions were answered. After discussion of options, patient requested an injection. Information regarding the injection was given to the patient. Which medications to stop prior to the injection was explained to the patient as well.\par Follow up in 1-2 weeks post injection for re-evaluation.\par Continue Home exercises, stretching, activity modification, physical therapy, and conservative care.\par Patient is presenting with acute/sub-acute radicular pain with impairment in ADLs and functionality. The pain has not responded to conservative care including nsaid therapy and/or physical therapy. There is no bleeding tendency, unstable medical condition, or systemic infection.\par

## 2023-03-23 NOTE — ASSESSMENT
[FreeTextEntry1] : BL L5-S1 TFESI with 90% relief of pain on R side, L side with 60% relief of pain improved rom and adls

## 2023-03-23 NOTE — HISTORY OF PRESENT ILLNESS
[Lower back] : lower back [4] : 4 [8] : 8 [Radiating] : radiating [Constant] : constant [Sleep] : sleep [Injection therapy] : injection therapy [Lying in bed] : lying in bed [FreeTextEntry1] : 3/23/23: pt is following up after B/L L5-S1 TFESI , she states that it helped her but the left side is still bothering her  [] : no

## 2023-03-23 NOTE — PHYSICAL EXAM
[de-identified] : PHYSICAL EXAM\par \par Constitutional: \par Appears well, no apparent distress\par Ability to communicate: Normal\par Respiratory: non-labored breathing\par Skin: no rash noted\par Head: normocephalic, atraumatic\par Neck: no visible thyroid enlargement\par Eyes: extraocular movements intact\par Neurologic: alert and oriented x3\par Psychiatric: normal mood, affect, and behavior\par \par Lumbar Spine: \par Palpation: left lumbar paraspinal spasm and left lumbar paraspinal tenderness to palpation.\par ROM: Diminished range of motion in all plains.  Patient notes pain with lateral bending to the left.\par MMT: Motor exam is 5/5 through out bilateral lower extremities.\par Sensation: Light touch and pain is intact throughout bilateral lower extremities.\par Reflexes: achilles and patella reflexes are intact and  symmetrical.  No sustained clonus.\par Special Testing: Positive straight leg raise on the left side.\par \par Assessemnt:\par Radiculopathy of lumbosacral region (M54.17)\par Myalgia (M79.10)\par \par Plan:\par After discussing various treatment options with the patient including but not limited to oral medications, physical therapy, exercise modalities as well as interventional spinal injections, we have decided with the following plan:\par The patient is presenting with acute/sub-acute radicular pain with impairment in ADLs and functionality.  The pain has not responded to conservative care including NSAID therapy and/or physical therapy.  There is no bleeding tendency, unstable medical condition, or systemic infection\par \par The risks, benefits and alternatives of the proposed procedure were explained in detail with the patient.  The risks outlined include but are not limited to infection, bleeding, post dural puncture headache, nerve injury, a temporary increase in pain, failure to resolve symptoms, allergic reaction, symptom recurrence, and possible elevation of blood sugar.  All questions were answered to patient's satisfaction and he/she verbalized an understanding.\par \par Follow up 1-2 weeks post injection foe re-evaluation.\par \par Continue home exercises, stretching, activity modification, physical therapy, and conservative care.\par \par \par

## 2023-04-18 ENCOUNTER — APPOINTMENT (OUTPATIENT)
Age: 68
End: 2023-04-18

## 2023-04-25 ENCOUNTER — NON-APPOINTMENT (OUTPATIENT)
Age: 68
End: 2023-04-25

## 2023-05-02 ENCOUNTER — APPOINTMENT (OUTPATIENT)
Dept: ORTHOPEDIC SURGERY | Facility: CLINIC | Age: 68
End: 2023-05-02
Payer: MEDICARE

## 2023-05-02 VITALS — WEIGHT: 147 LBS | BODY MASS INDEX: 26.05 KG/M2 | HEIGHT: 63 IN

## 2023-05-02 PROCEDURE — 20611 DRAIN/INJ JOINT/BURSA W/US: CPT | Mod: RT

## 2023-05-02 PROCEDURE — 99214 OFFICE O/P EST MOD 30 MIN: CPT | Mod: 25

## 2023-05-02 PROCEDURE — 73030 X-RAY EXAM OF SHOULDER: CPT | Mod: RT

## 2023-05-02 PROCEDURE — J3490M: CUSTOM | Mod: NC

## 2023-05-02 RX ORDER — DAPAGLIFLOZIN 10 MG/1
10 TABLET, FILM COATED ORAL
Refills: 0 | Status: DISCONTINUED | COMMUNITY
End: 2023-05-02

## 2023-05-02 RX ORDER — ROSUVASTATIN CALCIUM 5 MG/1
5 TABLET, FILM COATED ORAL
Refills: 0 | Status: DISCONTINUED | COMMUNITY
End: 2023-05-02

## 2023-05-02 RX ORDER — LEVOTHYROXINE SODIUM 88 UG/1
88 TABLET ORAL
Qty: 90 | Refills: 2 | Status: DISCONTINUED | COMMUNITY
Start: 2022-10-12 | End: 2023-05-02

## 2023-05-02 RX ORDER — HYDROCODONE BITARTRATE AND ACETAMINOPHEN 5; 325 MG/1; MG/1
5-325 TABLET ORAL
Qty: 15 | Refills: 0 | Status: DISCONTINUED | COMMUNITY
Start: 2023-01-17 | End: 2023-05-02

## 2023-05-02 RX ORDER — CLINDAMYCIN HYDROCHLORIDE 150 MG/1
150 CAPSULE ORAL 3 TIMES DAILY
Qty: 15 | Refills: 0 | Status: DISCONTINUED | COMMUNITY
Start: 2022-10-12 | End: 2023-05-02

## 2023-05-02 RX ORDER — HYDROCODONE BITARTRATE AND ACETAMINOPHEN 5; 325 MG/1; MG/1
5-325 TABLET ORAL EVERY 4 HOURS
Qty: 10 | Refills: 0 | Status: DISCONTINUED | COMMUNITY
Start: 2022-10-10 | End: 2023-05-02

## 2023-05-03 ENCOUNTER — FORM ENCOUNTER (OUTPATIENT)
Age: 68
End: 2023-05-03

## 2023-05-04 ENCOUNTER — APPOINTMENT (OUTPATIENT)
Dept: MRI IMAGING | Facility: CLINIC | Age: 68
End: 2023-05-04
Payer: MEDICARE

## 2023-05-04 ENCOUNTER — APPOINTMENT (OUTPATIENT)
Dept: PAIN MANAGEMENT | Facility: CLINIC | Age: 68
End: 2023-05-04

## 2023-05-04 PROCEDURE — 73221 MRI JOINT UPR EXTREM W/O DYE: CPT | Mod: RT,MH

## 2023-05-04 NOTE — DISCUSSION/SUMMARY
[Medication Risks Reviewed] : Medication risks reviewed [Surgical risks reviewed] : Surgical risks reviewed [de-identified] : \par Reviewed patients X-Ray right shoulder - benign with no significant degenerative change. \par Due to worsening pain and instability with mechanical symptoms, recommend the patient obtain MRI right shoulder to rule out rotator cuff tear vs tendonitis. Follow up after MRI to possibly rule out surgical pathology and discuss future treatment options. \par Discussed treatment options for pain management and inflammation in the form of injection therapy. \par  \par \par signs and symptoms of rotator cuff tear, discussed risks of potential rotator cuff  surgery and risks of operative  and non operative treatment of of tendonitis and impingement, will obtain mri to see if surgery is necessary and guide future treatment, in interim discussed use of nsaids and side effects and recommended rehab and discussed risks and benefits of injection \par \par The risks, benefits and contents of the injection have been discussed.  Risks include but are not limited to allergic reaction, flare reaction, permanent white skin discoloration at the injection site and infection.  The patient understands the risks and agrees to having the injection.  All questions have been answered. \par Patient elected to receive right shoulder 9/2 under US guidance.\par discussed mobic use and issues with kidney function given diabetes

## 2023-05-04 NOTE — HISTORY OF PRESENT ILLNESS
[de-identified] : Here for pain in the rihgt shoulder that has been on going for about 2 weeks or so. No injury to the area. Feels it more in the back of the joint and into the bicep area. No numbness or tingling. Feels it when lifting or when reaching back

## 2023-05-04 NOTE — PHYSICAL EXAM
[NL (0-70)] : full internal rotation 0-70 degrees [NL (0-90)] : full external rotation 0-90 degrees [4 ___] : forward flexion 4[unfilled]/5 [4___] : internal rotation 4[unfilled]/5 [Right] : right shoulder [There are no fractures, subluxations or dislocations. No significant abnormalities are seen] : There are no fractures, subluxations or dislocations. No significant abnormalities are seen [] : no deformity [TWNoteComboBox7] : active forward flexion 165 degrees [de-identified] : active abduction 165 degrees

## 2023-05-04 NOTE — PROCEDURE
[Large Joint Injection] : Large joint injection [Right] : of the right [Shoulder] : shoulder [Betadine] : betadine [Ethyl Chloride sprayed topically] : ethyl chloride sprayed topically [Sterile technique used] : sterile technique used [Call if redness, pain or fever occur] : call if redness, pain or fever occur [Apply ice for 15min out of every hour for the next 12-24 hours as tolerated] : apply ice for 15 minutes out of every hour for the next 12-24 hours as tolerated [Patient was advised to rest the joint(s) for ____ days] : patient was advised to rest the joint(s) for [unfilled] days [Previous OTC use and PT nontherapeutic] : patient has tried OTC's including aspirin, Ibuprofen, Aleve, etc or prescription NSAIDS, and/or exercises at home and/or physical therapy without satisfactory response [Risks, benefits, alternatives discussed / Verbal consent obtained] : the risks benefits, and alternatives have been discussed, and verbal consent was obtained [Prior failure or difficult injection] : prior failure or difficult injection [All ultrasound images have been permanently captured and stored accordingly in our picture archiving and communication system] : All ultrasound images have been permanently captured and stored accordingly in our picture archiving and communication system [Visualization of the needle and placement of injection was performed without complication] : visualization of the needle and placement of injection was performed without complication [Pain] : pain [FreeTextEntry1] :  right shoulder 9/2 under US guidance.  [FreeTextEntry3] : Large joint injection was performed of the right shoulder. An injection of Bupivacaine (Marcaine) cc of 0.5%  was used Betamethasone (Celestone) cc of 6mg. \par Patient was advised to call if redness, pain or fever occur and apply ice for 15 minutes out of every hour for the next 12-24 hours as tolerated. \par \par Patient has tried OTC's including aspirin, Ibuprofen, Aleve, etc or prescription NSAIDS, and/or exercises at home and/or physical therapy without satisfactory response and the risks benefits, and alternatives have been discussed, and verbal consent was obtained. \par Ultrasound guidance was indicated for this patient due to precise injection in area of tear. All ultrasound images have been permanently captured and stored accordingly in our picture archiving and communication system. Visualization of the needle and placement of injection was performed without complication. \par The findings showed no evidence of ligament, tendon or muscle tear and no effusion or other fluid collection.

## 2023-05-08 ENCOUNTER — APPOINTMENT (OUTPATIENT)
Dept: ORTHOPEDIC SURGERY | Facility: CLINIC | Age: 68
End: 2023-05-08
Payer: MEDICARE

## 2023-05-08 VITALS — BODY MASS INDEX: 26.05 KG/M2 | WEIGHT: 147 LBS | HEIGHT: 63 IN

## 2023-05-08 PROCEDURE — 99214 OFFICE O/P EST MOD 30 MIN: CPT

## 2023-05-09 ENCOUNTER — APPOINTMENT (OUTPATIENT)
Dept: ORTHOPEDIC SURGERY | Facility: AMBULATORY SURGERY CENTER | Age: 68
End: 2023-05-09

## 2023-05-09 ENCOUNTER — APPOINTMENT (OUTPATIENT)
Age: 68
End: 2023-05-09
Payer: MEDICARE

## 2023-05-09 PROCEDURE — 64483 NJX AA&/STRD TFRM EPI L/S 1: CPT | Mod: LT

## 2023-05-09 PROCEDURE — 64484 NJX AA&/STRD TFRM EPI L/S EA: CPT | Mod: LT

## 2023-05-10 NOTE — DISCUSSION/SUMMARY
[Medication Risks Reviewed] : Medication risks reviewed [Surgical risks reviewed] : Surgical risks reviewed [de-identified] : discussed tearing only partial which will normally respond to rehab and heal thus avoiding repair but combination of labral tearing with biceps subluxation almost always leads to need for surgery or persisent pain , discussed risks of surgery and recovery\par The risks and benefits of surgery have been discussed. Risks include but are not limited to bleeding, infection, reaction to anesthesia, injury to blood vessels and nerves, malunion, nonunion, DVT, PE, necessity of repeat surgery, chronic pain, loss of limb and death. The patient understands the risks and has chosen to proceed with conservative care. All questions have been answered.\par Discussed risks of potential surgery. However, due to the risks of the surgery, we will try NSAIDs and therapy. Discussed management of medication.\par Prescribed patient Mobic, and discussed risks of side effects and timing and management of medication.  Side effects can include but are not limited to gi ulcers and irritation, as well as kidney failure and bleeding issues. Use as directed and take with food.\par follow up 4 weeks\par uncontrolled diabetes complicated additional injections, having epidural this week but may inject biceps in future

## 2023-05-10 NOTE — PHYSICAL EXAM
[NL (0-70)] : full internal rotation 0-70 degrees [NL (0-90)] : full external rotation 0-90 degrees [4 ___] : forward flexion 4[unfilled]/5 [4___] : internal rotation 4[unfilled]/5 [Right] : right shoulder [There are no fractures, subluxations or dislocations. No significant abnormalities are seen] : There are no fractures, subluxations or dislocations. No significant abnormalities are seen [] : no deformity [TWNoteComboBox7] : active forward flexion 165 degrees [de-identified] : active abduction 165 degrees

## 2023-05-10 NOTE — HISTORY OF PRESENT ILLNESS
[de-identified] : Patient is here for a follow up appointment to review MRI results for the right shoulder. Patient states pain has not improved since the previous visit. Patient states use of ice and Tylenol somewhat helps with the pain.

## 2023-05-10 NOTE — DATA REVIEWED
[MRI] : MRI [Right] : of the right [Shoulder] : shoulder [I independently reviewed and interpreted images and report] : I independently reviewed and interpreted images and report [FreeTextEntry1] : partial cuff tear with biceps subluxation

## 2023-05-11 ENCOUNTER — APPOINTMENT (OUTPATIENT)
Dept: ENDOCRINOLOGY | Facility: CLINIC | Age: 68
End: 2023-05-11
Payer: MEDICARE

## 2023-05-11 VITALS
HEIGHT: 63 IN | WEIGHT: 149 LBS | OXYGEN SATURATION: 98 % | HEART RATE: 77 BPM | DIASTOLIC BLOOD PRESSURE: 80 MMHG | BODY MASS INDEX: 26.4 KG/M2 | TEMPERATURE: 97.8 F | SYSTOLIC BLOOD PRESSURE: 140 MMHG

## 2023-05-11 PROCEDURE — 99214 OFFICE O/P EST MOD 30 MIN: CPT | Mod: 25

## 2023-05-11 PROCEDURE — 95251 CONT GLUC MNTR ANALYSIS I&R: CPT

## 2023-05-11 RX ORDER — GLUCAGON INJECTION, SOLUTION 1 MG/.2ML
1 INJECTION, SOLUTION SUBCUTANEOUS
Qty: 1 | Refills: 2 | Status: ACTIVE | COMMUNITY
Start: 2023-05-11 | End: 1900-01-01

## 2023-05-11 NOTE — HISTORY OF PRESENT ILLNESS
[FreeTextEntry1] : 68 yo female f/u  for management of  DM2 and thyroid nodules. \par \par *** May 11, 2023 ***\par \par taking Toujeo 74 un qd, actos 30 mg qd, Trulicity 1.5 mg qw\par resumed fenofibrate\par gained weight since stopping farxiga. sugars are more erratic\par \par Date of download:  05/11/2023 \par Diabetes Medications and Dosage: as above\par Indication for CGMS: verify a change in the treatment regimen, identify periods of hypoglycemia/ hyperglycemia. \par Modal day report: pattern. \par Pt with HYPO  4% of the time ( NONE below 54),  57% in target range\par Hyperglycemia:  39% elevation \par Identified issues: carbohydrate counting\par dates analyzed: 04/28/2023 - 05/11/2023\par \par labs from 4/25/23- - a1c- 6.8, TSH- 1.26, TG- 413\par \par *** Dec 07, 2022 ***\par \par admitted with viral sepsis, cellulitis and shingles,  dehydration about 3 wks ago. apparently with DKA on admission. Stayed there for 4 days, on IV insulin, hydration\par discharged on prior regimen\par taking  toujeo 76 un hs, farxiga 10mg qd, actos 30 mg qd, Trulicity 1.5 gm qw\par labs from  11/29/22- bicarb- 24, ast/alt- 23/19, K- 4.3,  creat- 0.66, a1c- 6.6, f/amine- 254, ca- 10.5, iCa- 1.1 (0.9-1.3)\par labs from [facility] ( last day, at the discharge)- K- 3.3, co2- 29, neg ketones\par not checking FS- meter is broken. Prior was checking 4x day. wants to start sensors\par \par *** Sep 08, 2022 ***\par \par had covid in 05/22. still with a post-covid fatigue. \par lost weight. new "wet" hot flashes \par taking toujeo 76 un hs, farxiga 10mg qd,  actos 30 mg qd,  fenofibrate 200mg, crestor 5 mg\par resumed trulcity 1.5 mg qw\par PCP lowered  synthroid to 88 mcg last month\par not checking her FS\par \par *** Jan 26, 2022 ***\par \par feels ok. still with a back pain related to LS. getting steroid injections\par seeing another GI\par still no GES . stopped trulicity x 3 weeks to see if there is any help with her stomach issues, but there was no relief\par \par on  toujeo 76 un hs, trulicity 1.5 qw, farxiga 10mg qd, fenofibrate 200mg, crestor 5 mg, synthroid 100 mcg\par labs done 2 days ago at PCP- results are pending\par per pt, was told a1c - 6.1, and TFT's were fine\par reports fbs and ppg-  low 100's\par denies hypo's\par \par \par *** Aug 09, 2021 ***\par \par on  toujeo 76 un hs, trulicity 1.5 qw, farxiga 10mg qd, fenofibrate 200mg, crestor 5 mg, synthroid 100 mcg\par labs from 8/2/21- a1c- 7.4, TG- 213, LDL- 57, 25D- 40, TSH- 0.53, ca- 10.0,  PTH- 113\par reports DXA dome with her rheumatologist 2 months ago- reportedly stable, not on ART\par stopped checking FS\par still with stomach issues. did not do GES yet and did not try stopping Trulicity  yet\par \par *** Apr 08, 2021 ***\par \par on  toujeo 74 un hs, trulicity 1.5 qw, farxiga 10mg qd, fenofibrate 200mg, crestor 5 mg, synthroid 100 mcg\par reports fbs- under 120, ppg-not checking\par never proceeded with GES\par with worsening acid reflux. planning for EGD this month. otherwise, feels well\par \par labs (4/2/21)- TSH- 1.0, TG- 215, LDL- 82, a1c- 6.7, ca- 9.6, PTH-80\par \par *** Aug 18, 2020 ***\par \par on  toujeo 74 un hs, trulicity 1.5 qw, farxiga 10mg qd, fenofibrate 200mg, synthroid 100 mcg\par feels more tired, poss related to the radiation tx.\par hot flashes on letrozole\par reports fbs- 142-162, not checking ppg\par she stopped white bread and fruits within past 2 weeks- FS are much better\par today fbs- 116\par labs from 8/14/20- a1c- 7.3, TG- 196, LDL- 79, TSH- 2.08\par WBC- 2.67 with low ANC\par \par *** May 18, 2020 ***\par \par diagnosed with breast cancer. S/p lumpectomy. planned for XRT and hormonal therapy\par On toujeo 70 un hs, trulicity 1.5 qw, farxiga 10mg qd, fenofibrate  134 mg, Synthroid 112 mcg\par not taking crestor\par reports fbs- 120's- 130's, ppg- 160's\par \par *** Feb 11, 2020 ***\par \par on toujeo 70 un hs, trulicity 1.5 qw, farxiga 10mg qd\par not taking crestor b/o generalized myalgia\par TSH- 1.14\par TG-419\par a1c- 6.4\par \par no log, reports fbs- 140's, ppg- 160's\par today fbs in the office- 188\par denies hypo's\par \par \par \par *** Nov 13, 2019 ***\par recovering from cold. still on levaquin\par on toujeo 70 un hs, trulicity 1.5 qw, farxiga 10mg qd, synthroid 112 mcg, crestor 5mg qw (feels fine on this dose) \par not taking zetia\par no GES yet\par a1c- 6.7, iCa- 5.6, TSH- 1.9, ca- 10.0\par no Lipid panel done\par \par *** Aug 13, 2019 ***\par \par on  toujeo 70 un hs, trulicity 1.5 qw, farxiga 10mg qd,  synthroid  112 mcg, ramipril\par off HCTZ\par stopped crestor b/o myalgia. was rx'ed zetia\par off acarbose b/o GI issues\par did not do GES yet\par had an MRI abdomen done- reports normal results\par a1c- 6.3, f/am-272\par TSH- 1.09, FT4- 1.3, LDL- 122, TG- 289\par ca- 10.5\par no log, reports FBS- low 110's, ppg- upto 149\par \par *** May 29, 2019 ***\par \par s/p total tx (9/25/18). \par feels well. lost 3 lbs\par on  toujeo 70 un hs, trulicity 1.5 qw, farxiga 10mg qd,  acarbose 50mg with dinner (freq forgets to take it),  synthroid 112mcg\par - stopped fiasp b/o "stomach aches"\par off HCTZ b/o elevated calcium and noticed that BS is higher since then\par saw Dr. Crocker, had an EGD. still with bloating/ diarrhea. Per pt, symptoms started way prior to trulicity\par recalls 2 GES several yrs ago with equivocal results\par \par TSH- 1.3\par a1c- 6.4 <-- 6.6 <-- 8.6 <-- 8.2\par f/amine-265 <--  258\par g/queenie- 1.2\par \par - ca/PTH/D- nl\par reports FBS-  138- 202 , p/D- 150's- 160's. no more nocturnal hypo's\par \par FNA (7/20/18)- of LLP 2.6- benign follic nodule (Ramonita II)\par Thyr US (7/11/18)- multiple b/l nodules,incl dominant RMP 2.4, LMP 2.6 + 2.1, LLP 2.6.  All are stable\par \par 1mg ONDST- 1.6\par TSH- 0.19, FT4-1.5\par neg tpo/tg ab\par saw Dr Jalloh- s/p benign of Lt and isthmic nodules (no report is avail). \par \par *** stopped cycloset  b/o  stomach cramps, HA, fatigue \par \par Previously intolerant of metformin. \par failed lantus, toujeo, basaglar. tried tresiba\par \par HPI:\par Has been diagnosed with Hashimoto's thyroiditis and subclinical hyperthyroidism in 1998. Has been followed by Janet Hamm and [facility], but was never placed on antithyroidal medications. \par \par Was also diagnosed with MNG, and underwent a biopsy of her dominant right thyroid nodule by Dr Jalloh, which was benign. \par No history of neck surgery or radiation exposure to the neck area other than radiologic studies.  Family history is negative for thyroid illness. \par Currently, there are no local neck symptoms of anterior neck pain or problems with breathing, speaking, or swallowing.  Patient denies symptoms of hypothyroidism or hyperthyroidism.\par \par Also, with DM2 since 2003. Denies any complications\par recalls most recent a1c in the 8's range\par Lab review: \par \par TSH- 0.25 <-- 0.083\par \par FT4- 1.15\par \par neg tpo/tg antibodies\par \par \par Thyroid US (4/10/18)- large thyroid. Multiple bilateral nodules, including dominant RMP iso complex 2.5x1.5x3.2; LLP hypo 2.6x2.1x2.3; LMP isosolid 2.4x1.1x.2.1, and isthmic solid iso 1.1x0.6x1.2\par \par 24h I-123 thyroid uptake and scan (4/25/18)- "cold nodules in the LLP and thyroid isthmus". Uptake- 15.1%\par \par FNA (11/07/17) of RMP 2.6cm nodule- colloid nodule\par \par \par ********\par \par last cardio - 2019 (Dr Stevens)\par last ST- 2019\par last echo- 2019\par last podiatry - several years ago\par last ophthalmology- 8/21\par

## 2023-05-11 NOTE — ASSESSMENT
[Diabetes Foot Care] : diabetes foot care [Long Term Vascular Complications] : long term vascular complications of diabetes [Carbohydrate Consistent Diet] : carbohydrate consistent diet [Importance of Diet and Exercise] : importance of diet and exercise to improve glycemic control, achieve weight loss and improve cardiovascular health [Exercise/Effect on Glucose] : exercise/effect on glucose [Hypoglycemia Management] : hypoglycemia management [Glucagon Use] : glucagon use [Ketone Testing] : ketone testing [Action and use of Insulin] : action and use of short and long-acting insulin [Self Monitoring of Blood Glucose] : self monitoring of blood glucose [Insulin Self-Administration] : insulin self-administration [Injection Technique, Storage, Sharps Disposal] : injection technique, storage, and sharps disposal [Sick-Day Management] : sick-day management [Retinopathy Screening] : Patient was referred to ophthalmology for retinopathy screening [Diabetic Medications] : Risks and benefits of diabetic medications were discussed [FreeTextEntry1] : 1. T2DM\par - pt prev declined retrying metformin at this time b/o fear GI problems, but I would wait now in view of recent DKA\par - also, will stop Farxiga for now. would avoid SGLT2i given her recent ketoacidosis. I'd like to see hospital records\par - Naomie 2\par - options for insulin pump/ sensor reviewed. different pumps discussed. pt will consider- will give a sample of Dexcom. \par - decrease toujeo 66 un hs,  actos 30 mg qd, \par - d/c Trulicity and trial of Mounjaro 2.5 mg qw\par - keep off acarbose, but advised on bolus insulin\par - again advised GES, and if (+), will stop Mounjaro obtain the report of abd MRI. advised that testing (GES)  should be done off Mounjaro \par - crestor 5mg qd. might require combo with zetia\par - cont  fenofibrate 200 mg qd\par \par 2. Post-surgical hypothyroidism\par - obtain path report from Dr. Jalloh\par - synthroid  88 mcg \par - monitor ca/PTH. will do 24 h Uca\par - f/u pulmonary (Dr. Lang)\par - advised to f/u with hem/onc re: neutropenia\par RTC 3  mos

## 2023-05-15 ENCOUNTER — RX RENEWAL (OUTPATIENT)
Age: 68
End: 2023-05-15

## 2023-05-15 RX ORDER — GLIMEPIRIDE 1 MG/1
1 TABLET ORAL DAILY
Qty: 90 | Refills: 1 | Status: ACTIVE | COMMUNITY
Start: 2023-01-12 | End: 1900-01-01

## 2023-05-22 ENCOUNTER — APPOINTMENT (OUTPATIENT)
Dept: ORTHOPEDIC SURGERY | Facility: CLINIC | Age: 68
End: 2023-05-22
Payer: MEDICARE

## 2023-05-22 DIAGNOSIS — M25.811 OTHER SPECIFIED JOINT DISORDERS, RIGHT SHOULDER: ICD-10-CM

## 2023-05-22 PROCEDURE — J3490M: CUSTOM | Mod: NC

## 2023-05-22 PROCEDURE — 99214 OFFICE O/P EST MOD 30 MIN: CPT | Mod: 25

## 2023-05-22 PROCEDURE — 20611 DRAIN/INJ JOINT/BURSA W/US: CPT | Mod: RT

## 2023-05-22 NOTE — PROCEDURE
[Large Joint Injection] : Large joint injection [Right] : of the right [Glenohumeral Joint] : glenohumeral joint [X-ray evidence of Osteoarthritis on this or prior visit] : x-ray evidence of Osteoarthritis on this or prior visit [Betadine] : betadine [Ethyl Chloride sprayed topically] : ethyl chloride sprayed topically [___ cc    6mg] :  Betamethasone (Celestone) ~Vcc of 6mg [___ cc    0.5%] : Bupivacaine (Marcaine) ~Vcc of 0.5%  [] : Patient tolerated procedure well [Call if redness, pain or fever occur] : call if redness, pain or fever occur [Apply ice for 15min out of every hour for the next 12-24 hours as tolerated] : apply ice for 15 minutes out of every hour for the next 12-24 hours as tolerated [Previous OTC use and PT nontherapeutic] : patient has tried OTC's including aspirin, Ibuprofen, Aleve, etc or prescription NSAIDS, and/or exercises at home and/or physical therapy without satisfactory response [Patient had decreased mobility in the joint] : patient had decreased mobility in the joint [Risks, benefits, alternatives discussed / Verbal consent obtained] : the risks benefits, and alternatives have been discussed, and verbal consent was obtained [Glenohmeral injection] : glenohumeral injection [Precise injection in area of tear] : precise injection in area of tear [All ultrasound images have been permanently captured and stored accordingly in our picture archiving and communication system] : All ultrasound images have been permanently captured and stored accordingly in our picture archiving and communication system [Visualization of the needle and placement of injection was performed without complication] : visualization of the needle and placement of injection was performed without complication [Pain] : pain [Inflammation] : inflammation [Arthritis] : arthritis

## 2023-05-24 ENCOUNTER — APPOINTMENT (OUTPATIENT)
Dept: PAIN MANAGEMENT | Facility: CLINIC | Age: 68
End: 2023-05-24
Payer: MEDICARE

## 2023-05-24 VITALS — HEIGHT: 63 IN | BODY MASS INDEX: 26.05 KG/M2 | WEIGHT: 147 LBS

## 2023-05-24 PROCEDURE — 99213 OFFICE O/P EST LOW 20 MIN: CPT

## 2023-05-24 NOTE — REASON FOR VISIT
[Follow-Up Visit] : a follow-up pain management visit [FreeTextEntry2] : LT L4-5,L5-S1 TFESI(DOS 05/09/23-MAC)

## 2023-05-24 NOTE — PHYSICAL EXAM
[de-identified] : PHYSICAL EXAM\par \par Constitutional: \par Appears well, no apparent distress\par Ability to communicate: Normal\par Respiratory: non-labored breathing\par Skin: no rash noted\par Head: normocephalic, atraumatic\par Neck: no visible thyroid enlargement\par Eyes: extraocular movements intact\par Neurologic: alert and oriented x3\par Psychiatric: normal mood, affect, and behavior\par \par Lumbar Spine: \par Palpation: left lumbar paraspinal spasm and left lumbar paraspinal tenderness to palpation.\par ROM: Diminished range of motion in all plains.  Patient notes pain with lateral bending to the left.\par MMT: Motor exam is 5/5 through out bilateral lower extremities.\par Sensation: Light touch and pain is intact throughout bilateral lower extremities.+ tingling sensation medial shin \par Reflexes: achilles and patella reflexes are intact and  symmetrical.  No sustained clonus.\par Special Testing: Positive straight leg raise on the left side.\par \par Assessemnt:\par Radiculopathy of lumbosacral region (M54.17)\par Myalgia (M79.10)\par \par Plan:\par After discussing various treatment options with the patient including but not limited to oral medications, physical therapy, exercise modalities as well as interventional spinal injections, we have decided with the following plan:\par The patient is presenting with acute/sub-acute radicular pain with impairment in ADLs and functionality.  The pain has not responded to conservative care including NSAID therapy and/or physical therapy.  There is no bleeding tendency, unstable medical condition, or systemic infection\par \par The risks, benefits and alternatives of the proposed procedure were explained in detail with the patient.  The risks outlined include but are not limited to infection, bleeding, post dural puncture headache, nerve injury, a temporary increase in pain, failure to resolve symptoms, allergic reaction, symptom recurrence, and possible elevation of blood sugar.  All questions were answered to patient's satisfaction and he/she verbalized an understanding.\par \par Follow up 1-2 weeks post injection foe re-evaluation.\par \par Continue home exercises, stretching, activity modification, physical therapy, and conservative care.\par \par \par

## 2023-05-24 NOTE — ASSESSMENT
[FreeTextEntry1] : 65% relief pp with improved rom and adls\par some residual paresthesias in lower leg\par \par

## 2023-05-24 NOTE — HISTORY OF PRESENT ILLNESS
[Lower back] : lower back [7] : 7 [5] : 5 [] : yes [Constant] : constant [Household chores] : household chores [Leisure] : leisure [Meds] : meds [Standing] : standing [Walking] : walking [FreeTextEntry6] : numbness [FreeTextEntry7] : left leg

## 2023-05-25 ENCOUNTER — APPOINTMENT (OUTPATIENT)
Dept: PAIN MANAGEMENT | Facility: CLINIC | Age: 68
End: 2023-05-25

## 2023-05-26 NOTE — HISTORY OF PRESENT ILLNESS
[de-identified] : Patient is here for right shoulder. Pt notes some improvement in the right shoulder but notes pain when doing certain movements with the shoulder. Pt has been taking Tylenol for right shoulder pain. Pt had celestone injection in right shoulder on 5/2/23 which helped a little with the right shoulder.

## 2023-05-26 NOTE — PHYSICAL EXAM
[NL (0-70)] : full internal rotation 0-70 degrees [NL (0-90)] : full external rotation 0-90 degrees [4 ___] : forward flexion 4[unfilled]/5 [4___] : internal rotation 4[unfilled]/5 [Right] : right shoulder [There are no fractures, subluxations or dislocations. No significant abnormalities are seen] : There are no fractures, subluxations or dislocations. No significant abnormalities are seen [] : no deformity [TWNoteComboBox7] : active forward flexion 165 degrees [de-identified] : active abduction 165 degrees

## 2023-05-26 NOTE — DISCUSSION/SUMMARY
[Medication Risks Reviewed] : Medication risks reviewed [Surgical risks reviewed] : Surgical risks reviewed [de-identified] : Discussed risks of potential surgery. However, due to the risks of the surgery, we will try NSAIDs and therapy. Discussed management of medication.\par The risks and benefits of surgery have been discussed. Risks include but are not limited to bleeding, infection, reaction to anesthesia, injury to blood vessels and nerves, malunion, nonunion, DVT, PE, necessity of repeat surgery, chronic pain, loss of limb and death. The patient understands the risks and has chosen to proceed with conservative care. All questions have been answered.\par discussed surgery but wants to proceed with injection\par The risks, benefits and contents of the injection have been discussed.  Risks include but are not limited to allergic reaction, flare reaction, permanent white skin discoloration at the injection site and infection.  The patient understands the risks and agrees to having the injection.  All questions have been answered.\par discussed use of mobic and side effects

## 2023-06-02 RX ORDER — INSULIN GLARGINE 300 U/ML
300 INJECTION, SOLUTION SUBCUTANEOUS AT BEDTIME
Refills: 2 | Status: ACTIVE | COMMUNITY
Start: 2020-10-30

## 2023-06-04 ENCOUNTER — TRANSCRIPTION ENCOUNTER (OUTPATIENT)
Age: 68
End: 2023-06-04

## 2023-06-06 ENCOUNTER — TRANSCRIPTION ENCOUNTER (OUTPATIENT)
Age: 68
End: 2023-06-06

## 2023-06-07 NOTE — ASSESSMENT
[Hypoglycemia Management] : hypoglycemia management [Carbohydrate Consistent Diet] : carbohydrate consistent diet [Long Term Vascular Complications] : long term vascular complications of diabetes [Diabetes Foot Care] : diabetes foot care [Action and use of Insulin] : action and use of short and long-acting insulin [Self Monitoring of Blood Glucose] : self monitoring of blood glucose [Insulin Self-Administration] : insulin self-administration [FreeTextEntry1] : - pt declined retrying metformin at this time b/o fear GI problems\par - might retry tresiba once stomach issues resolve\par - toujeo 70 un hs, trulicity 1.5 qw, farxiga 10mg qd\par - keep off acarbose\par - refer for GES, and if (+), will stop trulicity. obtain the report of abd MRI\par - options for insulin pump/ sensor reviewed. different pumps discussed. pt will consider\par - obtain path report from Dr. Jalloh\par - synthroid  112 mcg \par - monitor ca/PTH\par -retry crestor 5mg qw, and if tolerates will slowly uptitrate. might require combo with zetia\par RTC  3 mos Never smoker

## 2023-06-28 ENCOUNTER — TRANSCRIPTION ENCOUNTER (OUTPATIENT)
Age: 68
End: 2023-06-28

## 2023-07-06 ENCOUNTER — APPOINTMENT (OUTPATIENT)
Dept: PAIN MANAGEMENT | Facility: CLINIC | Age: 68
End: 2023-07-06

## 2023-07-17 ENCOUNTER — APPOINTMENT (OUTPATIENT)
Dept: ORTHOPEDIC SURGERY | Facility: CLINIC | Age: 68
End: 2023-07-17
Payer: MEDICARE

## 2023-07-17 VITALS — BODY MASS INDEX: 26.05 KG/M2 | HEIGHT: 63 IN | WEIGHT: 147 LBS

## 2023-07-17 PROCEDURE — 99214 OFFICE O/P EST MOD 30 MIN: CPT

## 2023-07-18 ENCOUNTER — APPOINTMENT (OUTPATIENT)
Dept: ORTHOPEDIC SURGERY | Facility: CLINIC | Age: 68
End: 2023-07-18
Payer: MEDICARE

## 2023-07-18 VITALS — BODY MASS INDEX: 26.05 KG/M2 | WEIGHT: 147 LBS | HEIGHT: 63 IN

## 2023-07-18 PROCEDURE — 20611 DRAIN/INJ JOINT/BURSA W/US: CPT | Mod: LT

## 2023-07-18 PROCEDURE — 99214 OFFICE O/P EST MOD 30 MIN: CPT | Mod: 25

## 2023-07-18 PROCEDURE — J3490M: CUSTOM | Mod: NC

## 2023-07-18 NOTE — IMAGING
[de-identified] : LSPINE\par Inspection: no defects, deformity\par Palpation: No tenderness or spasm in bilateral and lumbar paraspinal musculature\par ROM: /diminished all planes\par Motor: no focal deficit \par Strength: 5/5 bilateral hip flexors, knee extensors, ankle dorsiflexors, EHL, ankle plantarflexors\par Sensation I LT \par + SLR LT \par Toe and heal walking intact \par Gait non antalgic, non myelopathic\par \par \par

## 2023-07-18 NOTE — ASSESSMENT
[FreeTextEntry1] : Chronic low back pain and LT LE radic. Treated by pain mgmt with LESI, continues with some numbness in LT LE. Will update LS MRI and have her f/up with Dr. Encinas with MRI. If pain mgmt sees the need will f/up with us.

## 2023-07-18 NOTE — REASON FOR VISIT
[FreeTextEntry2] : New Patient- here for lower back pain radiating pain down the left leg, has been seen Yadegar for epidurals numbness in the tibia.

## 2023-07-18 NOTE — HISTORY OF PRESENT ILLNESS
[de-identified] : 07/17/2023: This is a 68 year F with c/o of lumbar pain. Has been treated by pain mgmt Dr. Encinas, had 1 LESI with mild to no relief. Pain radiates into LT LE, exacerbates when walking short distances.

## 2023-07-19 NOTE — DISCUSSION/SUMMARY
[Medication Risks Reviewed] : Medication risks reviewed [Surgical risks reviewed] : Surgical risks reviewed [de-identified] : \par Patient has left knee djd with returning pain \par \par Patient has hx of cataracts \par \par Patient is also seeing Dr. Castro for pain management of sciatica and will follow up with him for further associated pain\par Discussed risks of surgical treatment and nonsurgical treatment of arthritis, discussed risks of steroid injection plus or minus viscosupplementation, risks of zilretta and benefits, role of surgery and MRI, risks and role of NSAIDs and side effects, benefits of therapy.\par \par Patient has seen relief with previous zilretta injections and will inject left knee with Zilretta today. \par evaluated knee and decided to proceed with zilretta injections, discussed future treatment and option, will proceed, discussed possible need for surgery vs alternatives in future\par The risks, benefits and contents of the injection have been discussed.  Risks include but are not limited to allergic reaction, flare reaction, permanent white skin discoloration at the injection site and infection.  The patient understands the risks and agrees to having the injection.  All questions have been answered.\par \par Discussed risks and benefits of total knee arthroplasty vs arthroscopic surgery however, risks outweigh benefits and we will try to avoid.\par Prescribed patient Mobic, and discussed risks of side effects and timing and management of medication.  Side effects can include but are not limited to gi ulcers and irritation, as well as kidney failure and bleeding issues. Use as directed and take with food.\par discussed risks of injections and oral meds with her diabetes, \par

## 2023-07-19 NOTE — PROCEDURE
[Large Joint Injection] : Large joint injection [Left] : of the left [Knee] : knee [Pain] : pain [X-ray evidence of Osteoarthritis on this or prior visit] : x-ray evidence of Osteoarthritis on this or prior visit [Alcohol] : alcohol [Betadine] : betadine [Ethyl Chloride sprayed topically] : ethyl chloride sprayed topically [Sterile technique used] : sterile technique used [___ cc    32 units 5mg] : Zilretta ~Vcc of 32 units 5 mg  [] : Patient tolerated procedure well [Call if redness, pain or fever occur] : call if redness, pain or fever occur [Apply ice for 15min out of every hour for the next 12-24 hours as tolerated] : apply ice for 15 minutes out of every hour for the next 12-24 hours as tolerated [Patient was advised to rest the joint(s) for ____ days] : patient was advised to rest the joint(s) for [unfilled] days [Previous OTC use and PT nontherapeutic] : patient has tried OTC's including aspirin, Ibuprofen, Aleve, etc or prescription NSAIDS, and/or exercises at home and/or physical therapy without satisfactory response [Patient had decreased mobility in the joint] : patient had decreased mobility in the joint [Risks, benefits, alternatives discussed / Verbal consent obtained] : the risks benefits, and alternatives have been discussed, and verbal consent was obtained [All ultrasound images have been permanently captured and stored accordingly in our picture archiving and communication system] : All ultrasound images have been permanently captured and stored accordingly in our picture archiving and communication system [Visualization of the needle and placement of injection was performed without complication] : visualization of the needle and placement of injection was performed without complication [de-identified] : 25cc [de-identified] : clear synovial fluid [FreeTextEntry3] : Patient has tried OTC's including aspirin, Ibuprofen, Aleve etc, or prescription NSAIDS, and/or exercises at home and/or physical therapy without satisfactory response, patient had decreased mobility in the joint and the risks, benefits and alternatives have been discussed, and verbal consent was obtained.\par

## 2023-07-19 NOTE — PHYSICAL EXAM
[NL (0-70)] : full internal rotation 0-70 degrees [NL (0-90)] : full external rotation 0-90 degrees [4 ___] : forward flexion 4[unfilled]/5 [4___] : internal rotation 4[unfilled]/5 [Right] : right shoulder [There are no fractures, subluxations or dislocations. No significant abnormalities are seen] : There are no fractures, subluxations or dislocations. No significant abnormalities are seen [TWNoteComboBox7] : active forward flexion 165 degrees [de-identified] : active abduction 165 degrees [Left] : left knee [] : full flexion and extension without pain 0-140

## 2023-07-19 NOTE — HISTORY OF PRESENT ILLNESS
[de-identified] : Patient is here for a follow up on the left knee. Patient notes numbness down her calf. Patient also has a history of back issues. Patient notes the numbness has been going on for a couple weeks without specific injury. Patient had zilretta injection in January and would like another if applicable. Patient notes pain medially of the knee but claims the numbness is her chief complaint.

## 2023-07-23 ENCOUNTER — FORM ENCOUNTER (OUTPATIENT)
Age: 68
End: 2023-07-23

## 2023-07-24 ENCOUNTER — APPOINTMENT (OUTPATIENT)
Dept: MRI IMAGING | Facility: CLINIC | Age: 68
End: 2023-07-24
Payer: MEDICARE

## 2023-07-24 PROCEDURE — 72148 MRI LUMBAR SPINE W/O DYE: CPT | Mod: MH

## 2023-08-09 ENCOUNTER — APPOINTMENT (OUTPATIENT)
Dept: PAIN MANAGEMENT | Facility: CLINIC | Age: 68
End: 2023-08-09
Payer: MEDICARE

## 2023-08-09 VITALS — HEIGHT: 63 IN | WEIGHT: 147 LBS | BODY MASS INDEX: 26.05 KG/M2

## 2023-08-09 PROCEDURE — 99214 OFFICE O/P EST MOD 30 MIN: CPT

## 2023-08-09 NOTE — HISTORY OF PRESENT ILLNESS
[Lower back] : lower back [7] : 7 [5] : 5 [] : yes [Constant] : constant [Household chores] : household chores [Leisure] : leisure [Meds] : meds [Standing] : standing [Walking] : walking [10] : 10 [Radiating] : radiating [Tingling] : tingling [FreeTextEntry1] : pt is following up for lower back pain , pain goes into the left side she states that she had an mri 2 weeks ago  [FreeTextEntry6] : numbness [FreeTextEntry7] : left leg

## 2023-08-09 NOTE — DISCUSSION/SUMMARY
[de-identified] : I personally reviewed the MRI/CT scan images and agree with the radiologist's report. The radiological findings were discussed with the patient. Patient is presenting with acute/sub-acute radicular pain with impairment in ADLs and functionality.  The pain has not responded to  conservative care including nsaid therapy and/or physical therapy.  There is no bleeding tendency, unstable medical condition, or systemic infection.  proceed with left l5-s1 and s1 tfesi

## 2023-08-09 NOTE — PHYSICAL EXAM

## 2023-08-15 ENCOUNTER — APPOINTMENT (OUTPATIENT)
Age: 68
End: 2023-08-15

## 2023-08-29 ENCOUNTER — APPOINTMENT (OUTPATIENT)
Age: 68
End: 2023-08-29
Payer: MEDICARE

## 2023-08-29 PROCEDURE — 64484 NJX AA&/STRD TFRM EPI L/S EA: CPT | Mod: LT

## 2023-08-29 PROCEDURE — 64483 NJX AA&/STRD TFRM EPI L/S 1: CPT | Mod: LT

## 2023-08-30 ENCOUNTER — RX RENEWAL (OUTPATIENT)
Age: 68
End: 2023-08-30

## 2023-08-30 RX ORDER — LEVOTHYROXINE SODIUM 0.09 MG/1
88 TABLET ORAL
Qty: 90 | Refills: 2 | Status: ACTIVE | COMMUNITY
Start: 2023-08-30 | End: 1900-01-01

## 2023-09-13 ENCOUNTER — APPOINTMENT (OUTPATIENT)
Dept: PAIN MANAGEMENT | Facility: CLINIC | Age: 68
End: 2023-09-13
Payer: MEDICARE

## 2023-09-13 VITALS — BODY MASS INDEX: 26.05 KG/M2 | WEIGHT: 147 LBS | HEIGHT: 63 IN

## 2023-09-13 PROCEDURE — 20552 NJX 1/MLT TRIGGER POINT 1/2: CPT

## 2023-09-13 PROCEDURE — J3490M: CUSTOM | Mod: NC

## 2023-09-13 PROCEDURE — 99214 OFFICE O/P EST MOD 30 MIN: CPT | Mod: 25

## 2023-09-24 ENCOUNTER — APPOINTMENT (OUTPATIENT)
Dept: ORTHOPEDIC SURGERY | Facility: CLINIC | Age: 68
End: 2023-09-24

## 2023-09-25 ENCOUNTER — RESULT CHARGE (OUTPATIENT)
Age: 68
End: 2023-09-25

## 2023-09-25 ENCOUNTER — APPOINTMENT (OUTPATIENT)
Dept: ORTHOPEDIC SURGERY | Facility: CLINIC | Age: 68
End: 2023-09-25
Payer: MEDICARE

## 2023-09-25 VITALS — WEIGHT: 147 LBS | HEIGHT: 63 IN | BODY MASS INDEX: 26.05 KG/M2

## 2023-09-25 PROCEDURE — 73564 X-RAY EXAM KNEE 4 OR MORE: CPT | Mod: LT

## 2023-09-25 PROCEDURE — 99214 OFFICE O/P EST MOD 30 MIN: CPT

## 2023-09-25 RX ORDER — METHYLPREDNISOLONE 4 MG/1
4 TABLET ORAL
Qty: 1 | Refills: 0 | Status: ACTIVE | COMMUNITY
Start: 2023-09-25 | End: 1900-01-01

## 2023-09-26 ENCOUNTER — APPOINTMENT (OUTPATIENT)
Dept: MRI IMAGING | Facility: CLINIC | Age: 68
End: 2023-09-26
Payer: MEDICARE

## 2023-09-26 PROCEDURE — 73721 MRI JNT OF LWR EXTRE W/O DYE: CPT | Mod: LT,MH

## 2023-09-28 ENCOUNTER — APPOINTMENT (OUTPATIENT)
Dept: ENDOCRINOLOGY | Facility: CLINIC | Age: 68
End: 2023-09-28
Payer: MEDICARE

## 2023-09-28 VITALS
SYSTOLIC BLOOD PRESSURE: 122 MMHG | HEIGHT: 63 IN | BODY MASS INDEX: 25.87 KG/M2 | DIASTOLIC BLOOD PRESSURE: 76 MMHG | HEART RATE: 78 BPM | WEIGHT: 146 LBS | TEMPERATURE: 98.3 F | RESPIRATION RATE: 16 BRPM

## 2023-09-28 LAB — GLUCOSE BLDC GLUCOMTR-MCNC: 106

## 2023-09-28 PROCEDURE — 82962 GLUCOSE BLOOD TEST: CPT

## 2023-09-28 PROCEDURE — 95251 CONT GLUC MNTR ANALYSIS I&R: CPT

## 2023-09-28 PROCEDURE — 99214 OFFICE O/P EST MOD 30 MIN: CPT | Mod: 25

## 2023-09-28 RX ORDER — DULAGLUTIDE 1.5 MG/.5ML
1.5 INJECTION, SOLUTION SUBCUTANEOUS
Refills: 0 | Status: DISCONTINUED | COMMUNITY
End: 2023-09-28

## 2023-09-28 RX ORDER — INSULIN LISPRO-AABC 100 [IU]/ML
100 INJECTION, SOLUTION SUBCUTANEOUS
Qty: 2 | Refills: 2 | Status: ACTIVE | COMMUNITY
Start: 2023-09-28 | End: 1900-01-01

## 2023-10-13 ENCOUNTER — APPOINTMENT (OUTPATIENT)
Dept: PAIN MANAGEMENT | Facility: CLINIC | Age: 68
End: 2023-10-13
Payer: MEDICARE

## 2023-10-13 VITALS — HEIGHT: 63 IN | BODY MASS INDEX: 25.87 KG/M2 | WEIGHT: 146 LBS

## 2023-10-13 PROCEDURE — 20552 NJX 1/MLT TRIGGER POINT 1/2: CPT

## 2023-10-13 PROCEDURE — 99214 OFFICE O/P EST MOD 30 MIN: CPT | Mod: 25

## 2023-10-13 PROCEDURE — J3490M: CUSTOM | Mod: NC

## 2023-10-16 ENCOUNTER — APPOINTMENT (OUTPATIENT)
Dept: ORTHOPEDIC SURGERY | Facility: CLINIC | Age: 68
End: 2023-10-16
Payer: MEDICARE

## 2023-10-16 VITALS — BODY MASS INDEX: 25.87 KG/M2 | HEIGHT: 63 IN | WEIGHT: 146 LBS

## 2023-10-16 PROCEDURE — 20611 DRAIN/INJ JOINT/BURSA W/US: CPT | Mod: LT

## 2023-10-16 PROCEDURE — J3490M: CUSTOM | Mod: NC

## 2023-10-16 PROCEDURE — 99214 OFFICE O/P EST MOD 30 MIN: CPT | Mod: 25

## 2023-10-27 ENCOUNTER — NON-APPOINTMENT (OUTPATIENT)
Age: 68
End: 2023-10-27

## 2023-11-20 ENCOUNTER — RX RENEWAL (OUTPATIENT)
Age: 68
End: 2023-11-20

## 2023-11-20 RX ORDER — PREGABALIN 50 MG/1
50 CAPSULE ORAL
Qty: 60 | Refills: 0 | Status: ACTIVE | COMMUNITY
Start: 2023-09-13 | End: 1900-01-01

## 2023-11-27 ENCOUNTER — APPOINTMENT (OUTPATIENT)
Dept: PAIN MANAGEMENT | Facility: CLINIC | Age: 68
End: 2023-11-27
Payer: MEDICARE

## 2023-11-27 VITALS — HEIGHT: 63 IN | BODY MASS INDEX: 25.87 KG/M2 | WEIGHT: 146 LBS

## 2023-11-27 PROCEDURE — 99214 OFFICE O/P EST MOD 30 MIN: CPT

## 2023-12-19 ENCOUNTER — APPOINTMENT (OUTPATIENT)
Age: 68
End: 2023-12-19
Payer: MEDICARE

## 2023-12-19 PROCEDURE — 62323 NJX INTERLAMINAR LMBR/SAC: CPT

## 2024-01-02 ENCOUNTER — APPOINTMENT (OUTPATIENT)
Dept: ENDOCRINOLOGY | Facility: CLINIC | Age: 69
End: 2024-01-02
Payer: MEDICARE

## 2024-01-02 VITALS
HEART RATE: 78 BPM | BODY MASS INDEX: 24.8 KG/M2 | DIASTOLIC BLOOD PRESSURE: 70 MMHG | RESPIRATION RATE: 16 BRPM | SYSTOLIC BLOOD PRESSURE: 118 MMHG | WEIGHT: 140 LBS | HEIGHT: 63 IN | OXYGEN SATURATION: 98 % | TEMPERATURE: 97.6 F

## 2024-01-02 LAB — GLUCOSE BLDC GLUCOMTR-MCNC: 131

## 2024-01-02 PROCEDURE — 82962 GLUCOSE BLOOD TEST: CPT

## 2024-01-02 PROCEDURE — 99214 OFFICE O/P EST MOD 30 MIN: CPT | Mod: 25

## 2024-01-02 NOTE — QUALITY MEASURES
[Visual inspection, sensory exam] : Foot exam, including visual inspection, sensory exam with mono filament, and pulse exam, was performed within the last 12 months swing-through gait

## 2024-01-02 NOTE — ASSESSMENT
[Diabetes Foot Care] : diabetes foot care [Long Term Vascular Complications] : long term vascular complications of diabetes [Carbohydrate Consistent Diet] : carbohydrate consistent diet [Importance of Diet and Exercise] : importance of diet and exercise to improve glycemic control, achieve weight loss and improve cardiovascular health [Exercise/Effect on Glucose] : exercise/effect on glucose [Hypoglycemia Management] : hypoglycemia management [Glucagon Use] : glucagon use [Ketone Testing] : ketone testing [Action and use of Insulin] : action and use of short and long-acting insulin [Self Monitoring of Blood Glucose] : self monitoring of blood glucose [Insulin Self-Administration] : insulin self-administration [Injection Technique, Storage, Sharps Disposal] : injection technique, storage, and sharps disposal [Sick-Day Management] : sick-day management [Retinopathy Screening] : Patient was referred to ophthalmology for retinopathy screening [Diabetic Medications] : Risks and benefits of diabetic medications were discussed [FreeTextEntry1] : 1. T2DM - pt prev declined retrying metformin at this time b/o fear GI problems, but I would wait now in view of recent DKA - also, keep off Farxiga for now. would avoid SGLT2i given her recent ketoacidosis. I'd like to see hospital records - switch to Naomie 3 - options for insulin pump/ sensor reviewed. different pumps discussed. pt will consider- will give a sample of Dexcom. - toujeo 48 un hs, actos 30 mg qd, - Mounjaro 10 mg qw - will not uptitrate at present - keep off acarbose, but advised on bolus insulin- will rx Lyumjev ; can take 4-5 un ac meals post injections - again advised that testing (GES) should be done off Mounjaro - crestor 5mg qd. might require combo with zetia - fenofibrate 200 mg qd  2. Post-surgical hypothyroidism - obtain path report from Dr. Jalloh - synthroid 88 mcg - monitor ca/PTH. will do 24 h [facility] - f/u pulmonary (Dr. Lang) - advised to f/u with hem/onc re: neutropenia/ anemia RTC 3 mos.

## 2024-01-02 NOTE — HISTORY OF PRESENT ILLNESS
[FreeTextEntry1] : 69 yo female f/u  for management of  DM2 and thyroid nodules.   *** Jan 02, 2024 ***  feels more tired recently last labs from 12/5/23-  a1c- 5.9, WBC- 2.9, Hbg- 10.2, ca- 9.3, LDL- 82, 25D- 26, TSH- 0.55 plans to go back to hematologist soon getting monthly B12 injections taking  toujeo 48 un hs, actos 30 mg qd, Mounjaro 10 mg qw,crestor 5mg qd, fenofibrate 200 mg qd,  synthroid 88 mcg has not been wearing her sensor recently reports fbs - 120's- 130's denies hypo's  *** Sep 28, 2023 ***  feels fine tolerates mounjaro well, thinks it's been working better than trulicity. lost a few lbs on  toujeo 54 un hs,  actos 30 mg qd,  Mounjaro 7.5 mg qw labs from 9/25/23- a1c- 6.9, LDL- 99, TSH- 1.1 has been receiving steroids injections to the back q 3 months- sugars are high for a few days after wearing Naomie 2 Date of download:  09/28/2023  Diabetes Medications and Dosage: as above Indication for CGMS: verify a change in the treatment regimen, identify periods of hypoglycemia/ hyperglycemia.  Modal day report: pattern.  Pt with HYPO  0% of the time ( NONE below 54),  75% in target range Hyperglycemia:  25% elevation  Identified issues: carbohydrate counting dates analyzed: 08/18/2023- 08/31/2023  *** May 11, 2023 ***  taking Toujeo 74 un qd, actos 30 mg qd, Trulicity 1.5 mg qw resumed fenofibrate gained weight since stopping farxiga. sugars are more erratic  Date of download:  05/11/2023  Diabetes Medications and Dosage: as above Indication for CGMS: verify a change in the treatment regimen, identify periods of hypoglycemia/ hyperglycemia.  Modal day report: pattern.  Pt with HYPO  4% of the time ( NONE below 54),  57% in target range Hyperglycemia:  39% elevation  Identified issues: carbohydrate counting dates analyzed: 04/28/2023 - 05/11/2023  labs from 4/25/23- - a1c- 6.8, TSH- 1.26, TG- 413  *** Dec 07, 2022 ***  admitted with viral sepsis, cellulitis and shingles,  dehydration about 3 wks ago. apparently with DKA on admission. Stayed there for 4 days, on IV insulin, hydration discharged on prior regimen taking  toujeo 76 un hs, farxiga 10mg qd, actos 30 mg qd, Trulicity 1.5 gm qw labs from  11/29/22- bicarb- 24, ast/alt- 23/19, K- 4.3,  creat- 0.66, a1c- 6.6, f/amine- 254, ca- 10.5, iCa- 1.1 (0.9-1.3) labs from [facility] ( last day, at the discharge)- K- 3.3, co2- 29, neg ketones not checking FS- meter is broken. Prior was checking 4x day. wants to start sensors  *** Sep 08, 2022 ***  had covid in 05/22. still with a post-covid fatigue.  lost weight. new "wet" hot flashes  taking toujeo 76 un hs, farxiga 10mg qd,  actos 30 mg qd,  fenofibrate 200mg, crestor 5 mg resumed trulcity 1.5 mg qw PCP lowered  synthroid to 88 mcg last month not checking her FS  *** Jan 26, 2022 ***  feels ok. still with a back pain related to LS. getting steroid injections seeing another GI still no GES . stopped trulicity x 3 weeks to see if there is any help with her stomach issues, but there was no relief  on  toujeo 76 un hs, trulicity 1.5 qw, farxiga 10mg qd, fenofibrate 200mg, crestor 5 mg, synthroid 100 mcg labs done 2 days ago at PCP- results are pending per pt, was told a1c - 6.1, and TFT's were fine reports fbs and ppg-  low 100's denies hypo's   *** Aug 09, 2021 ***  on  toujeo 76 un hs, trulicity 1.5 qw, farxiga 10mg qd, fenofibrate 200mg, crestor 5 mg, synthroid 100 mcg labs from 8/2/21- a1c- 7.4, TG- 213, LDL- 57, 25D- 40, TSH- 0.53, ca- 10.0,  PTH- 113 reports DXA dome with her rheumatologist 2 months ago- reportedly stable, not on ART stopped checking FS still with stomach issues. did not do GES yet and did not try stopping Trulicity  yet  *** Apr 08, 2021 ***  on  toujeo 74 un hs, trulicity 1.5 qw, farxiga 10mg qd, fenofibrate 200mg, crestor 5 mg, synthroid 100 mcg reports fbs- under 120, ppg-not checking never proceeded with GES with worsening acid reflux. planning for EGD this month. otherwise, feels well  labs (4/2/21)- TSH- 1.0, TG- 215, LDL- 82, a1c- 6.7, ca- 9.6, PTH-80  *** Aug 18, 2020 ***  on  toujeo 74 un hs, trulicity 1.5 qw, farxiga 10mg qd, fenofibrate 200mg, synthroid 100 mcg feels more tired, poss related to the radiation tx. hot flashes on letrozole reports fbs- 142-162, not checking ppg she stopped white bread and fruits within past 2 weeks- FS are much better today fbs- 116 labs from 8/14/20- a1c- 7.3, TG- 196, LDL- 79, TSH- 2.08 WBC- 2.67 with low ANC  *** May 18, 2020 ***  diagnosed with breast cancer. S/p lumpectomy. planned for XRT and hormonal therapy On toujeo 70 un hs, trulicity 1.5 qw, farxiga 10mg qd, fenofibrate  134 mg, Synthroid 112 mcg not taking crestor reports fbs- 120's- 130's, ppg- 160's  *** Feb 11, 2020 ***  on toujeo 70 un hs, trulicity 1.5 qw, farxiga 10mg qd not taking crestor b/o generalized myalgia TSH- 1.14 TG-419 a1c- 6.4  no log, reports fbs- 140's, ppg- 160's today fbs in the office- 188 denies hypo's    *** Nov 13, 2019 *** recovering from cold. still on levaquin on toujeo 70 un hs, trulicity 1.5 qw, farxiga 10mg qd, synthroid 112 mcg, crestor 5mg qw (feels fine on this dose)  not taking zetia no GES yet a1c- 6.7, iCa- 5.6, TSH- 1.9, ca- 10.0 no Lipid panel done  *** Aug 13, 2019 ***  on  toujeo 70 un hs, trulicity 1.5 qw, farxiga 10mg qd,  synthroid  112 mcg, ramipril off HCTZ stopped crestor b/o myalgia. was rx'ed zetia off acarbose b/o GI issues did not do GES yet had an MRI abdomen done- reports normal results a1c- 6.3, f/am-272 TSH- 1.09, FT4- 1.3, LDL- 122, TG- 289 ca- 10.5 no log, reports FBS- low 110's, ppg- upto 149  *** May 29, 2019 ***  s/p total tx (9/25/18).  feels well. lost 3 lbs on  toujeo 70 un hs, trulicity 1.5 qw, farxiga 10mg qd,  acarbose 50mg with dinner (freq forgets to take it),  synthroid 112mcg - stopped fiasp b/o "stomach aches" off HCTZ b/o elevated calcium and noticed that BS is higher since then saw Dr. Crocker, had an EGD. still with bloating/ diarrhea. Per pt, symptoms started way prior to trulicity recalls 2 GES several yrs ago with equivocal results  TSH- 1.3 a1c- 6.4 <-- 6.6 <-- 8.6 <-- 8.2 f/amine-265 <--  258 g/queenie- 1.2  - ca/PTH/D- nl reports FBS-  138- 202 , p/D- 150's- 160's. no more nocturnal hypo's  FNA (7/20/18)- of LLP 2.6- benign follic nodule (Ramonita II) Thyr US (7/11/18)- multiple b/l nodules,incl dominant RMP 2.4, LMP 2.6 + 2.1, LLP 2.6.  All are stable  1mg ONDST- 1.6 TSH- 0.19, FT4-1.5 neg tpo/tg ab saw Dr Jalloh- s/p benign of Lt and isthmic nodules (no report is avail).   *** stopped cycloset  b/o  stomach cramps, HA, fatigue   Previously intolerant of metformin.  failed lantus, toujeo, basaglar. tried tresiba  HPI: Has been diagnosed with Hashimoto's thyroiditis and subclinical hyperthyroidism in 1998. Has been followed by Janet Hamm and [facility], but was never placed on antithyroidal medications.   Was also diagnosed with MNG, and underwent a biopsy of her dominant right thyroid nodule by Dr Jalloh, which was benign.  No history of neck surgery or radiation exposure to the neck area other than radiologic studies.  Family history is negative for thyroid illness.  Currently, there are no local neck symptoms of anterior neck pain or problems with breathing, speaking, or swallowing.  Patient denies symptoms of hypothyroidism or hyperthyroidism.  Also, with DM2 since 2003. Denies any complications recalls most recent a1c in the 8's range Lab review:   TSH- 0.25 <-- 0.083  FT4- 1.15  neg tpo/tg antibodies   Thyroid US (4/10/18)- large thyroid. Multiple bilateral nodules, including dominant RMP iso complex 2.5x1.5x3.2; LLP hypo 2.6x2.1x2.3; LMP isosolid 2.4x1.1x.2.1, and isthmic solid iso 1.1x0.6x1.2  24h I-123 thyroid uptake and scan (4/25/18)- "cold nodules in the LLP and thyroid isthmus". Uptake- 15.1%  FNA (11/07/17) of RMP 2.6cm nodule- colloid nodule   ********  last cardio - 2019 (Dr Stevens) last ST- 2019 last echo- 2019 last podiatry - several years ago last ophthalmology- 8/21

## 2024-01-03 ENCOUNTER — APPOINTMENT (OUTPATIENT)
Dept: PAIN MANAGEMENT | Facility: CLINIC | Age: 69
End: 2024-01-03
Payer: MEDICARE

## 2024-01-03 VITALS — HEIGHT: 63 IN | BODY MASS INDEX: 24.8 KG/M2 | WEIGHT: 140 LBS

## 2024-01-03 PROCEDURE — 99214 OFFICE O/P EST MOD 30 MIN: CPT

## 2024-01-03 NOTE — HISTORY OF PRESENT ILLNESS
[Lower back] : lower back [10] : 10 [Radiating] : radiating [Tingling] : tingling [Constant] : constant [Household chores] : household chores [Leisure] : leisure [Meds] : meds [Standing] : standing [Walking] : walking [FreeTextEntry1] : pt is following up after procedure states that it helped the numbness and tingling  [8] : 8 [Sleep] : sleep [Injection therapy] : injection therapy [Lying in bed] : lying in bed [] : no [FreeTextEntry6] : numbness [FreeTextEntry7] : left leg [de-identified] : driving

## 2024-01-03 NOTE — DISCUSSION/SUMMARY
[de-identified] : proceed BL L45 TFESI  After discussing various treatment options with the patient including but not limited to oral medications, physical therapy, exercise, modalities as well as interventional spinal injections, we have decided with the following plan: I personally reviewed the MRI/CT scan images and agree with the radiologist's report. The radiological findings were discussed with the patient. The risks, benefits, contents and alternatives to injection were explained in full to the patient. Risks outlined include but are not limited to infection,sepsis, bleeding, post-dural puncture headache, nerve damage, temporary increase in pain, syncopal episode, failure to resolve symptoms, allergic reaction, symptom recurrence, and elevation of blood sugar in diabetics. Cortisone may cause immunosuppression. Patient understands the risks. All questions were answered. After discussion of options, patient requested an injection. Information regarding the injection was given to the patient. Which medications to stop prior to the injection was explained to the patient as well. Follow up in 1-2 weeks post injection for re-evaluation. Continue Home exercises, stretching, activity modification, physical therapy, and conservative care. Patient is presenting with acute/sub-acute radicular pain with impairment in ADLs and functionality. The pain has not responded to conservative care including nsaid therapy and/or physical therapy. There is no bleeding tendency, unstable medical condition, or systemic infection.

## 2024-01-03 NOTE — ASSESSMENT
[FreeTextEntry1] : transient relief with LESI of pain 50%  numbness in L foot is resolved  has stopped gabapentin bc she states that it wasnt helping her, and states that she was unable to tolerate lyrica in past  not interested in SCS

## 2024-01-03 NOTE — PHYSICAL EXAM
[de-identified] : PHYSICAL EXAM  Constitutional:  Appears well, no apparent distress Ability to communicate: Normal Respiratory: non-labored breathing Skin: no rash noted Head: normocephalic, atraumatic Neck: no visible thyroid enlargement Eyes: extraocular movements intact Neurologic: alert and oriented x3 Psychiatric: normal mood, affect, and behavior  Lumbar Spine:  Palpation: left and right lumbar paraspinal spasm and left and right lumbar paraspinal tenderness to palpation. ROM: Diminished range of motion in all plains.  Patient notes pain with lateral bending to the left and right. MMT: Motor exam is 5/5 through out bilateral lower extremities. Sensation: Light touch and pain is intact throughout bilateral lower extremities. Reflexes: achilles and patella reflexes are intact and  symmetrical.  No sustained clonus. Special Testing: Positive straight leg raise on the left and right side.  Assessemnt: Radiculopathy of lumbosacral region (M54.17) Myalgia (M79.10)  Plan: After discussing various treatment options with the patient including but not limited to oral medications, physical therapy, exercise modalities as well as interventional spinal injections, we have decided with the following plan: The patient is presenting with acute/sub-acute radicular pain with impairment in ADLs and functionality.  The pain has not responded to conservative care including NSAID therapy and/or physical therapy.  There is no bleeding tendency, unstable medical condition, or systemic infection  The risks, benefits and alternatives of the proposed procedure were explained in detail with the patient.  The risks outlined include but are not limited to infection, bleeding, post dural puncture headache, nerve injury, a temporary increase in pain, failure to resolve symptoms, allergic reaction, symptom recurrence, and possible elevation of blood sugar.  All questions were answered to patient's satisfaction and he/she verbalized an understanding.  Follow up 1-2 weeks post injection foe re-evaluation.  Continue home exercises, stretching, activity modification, physical therapy, and conservative care.

## 2024-02-12 NOTE — REASON FOR VISIT
Quality 226: Preventive Care And Screening: Tobacco Use: Screening And Cessation Intervention: Patient screened for tobacco use and is an ex/non-smoker
Detail Level: Detailed
[FreeTextEntry2] : NI right hand

## 2024-02-13 ENCOUNTER — APPOINTMENT (OUTPATIENT)
Age: 69
End: 2024-02-13

## 2024-03-05 ENCOUNTER — RESULT CHARGE (OUTPATIENT)
Age: 69
End: 2024-03-05

## 2024-03-05 ENCOUNTER — APPOINTMENT (OUTPATIENT)
Dept: ORTHOPEDIC SURGERY | Facility: CLINIC | Age: 69
End: 2024-03-05
Payer: MEDICARE

## 2024-03-05 VITALS — BODY MASS INDEX: 24.8 KG/M2 | HEIGHT: 63 IN | WEIGHT: 140 LBS

## 2024-03-05 PROCEDURE — 99214 OFFICE O/P EST MOD 30 MIN: CPT

## 2024-03-05 PROCEDURE — 73564 X-RAY EXAM KNEE 4 OR MORE: CPT | Mod: LT

## 2024-03-06 ENCOUNTER — APPOINTMENT (OUTPATIENT)
Dept: MRI IMAGING | Facility: CLINIC | Age: 69
End: 2024-03-06
Payer: MEDICARE

## 2024-03-06 PROCEDURE — 73721 MRI JNT OF LWR EXTRE W/O DYE: CPT | Mod: LT,MH

## 2024-03-08 NOTE — PHYSICAL EXAM
[Left] : left knee [All Views] : anteroposterior, lateral, skyline, and anteroposterior standing [] : no obvious defects [FreeTextEntry8] : Lateral patella tenderness  [de-identified] : LIMITED BY PAIN [TWNoteComboBox7] : flexion 100 degrees [FreeTextEntry9] : X-Ray left knee reveals evidence of advanced bone on bone tricompartmental degenerative changes, advanced patellofemoral with associated spurs and calcifications lateral patella nondisplaced fracture.  [de-identified] : extension 5 degrees

## 2024-03-08 NOTE — DISCUSSION/SUMMARY
[Medication Risks Reviewed] : Medication risks reviewed [Surgical risks reviewed] : Surgical risks reviewed [de-identified] : X-Ray left knee reveals evidence of advanced bone on bone tricompartmental degenerative changes, advanced patellofemoral with associated spurs and calcifications lateral patella nondisplaced fracture.   Recommend the patient obtain an MRI right knee to evaluate displacement of lateral patella fracture and rule out any surgical management.  Advised the patient that considering non-displaced nature of fracture, likely should not require surgery however she is aware and understands that she is a candidate for total knee arthroplasty.    Due to large effusion, aspiration indicated. Aspirated 30 ccs of blood.   The patient will begin use of Patella stabilizer knee brace to ensure stability and avoid any recurrent instability/buckling events - fitted and dispensed in office today.   She will limit any dynamic activity and prolonged walking.

## 2024-03-08 NOTE — HISTORY OF PRESENT ILLNESS
[de-identified] : Injury to the left knee that happened on Sunday. patient was at family's house and tripped over box in the hallway. She fell onto the knee directly and has pain within the joint in general

## 2024-03-11 ENCOUNTER — APPOINTMENT (OUTPATIENT)
Dept: ORTHOPEDIC SURGERY | Facility: CLINIC | Age: 69
End: 2024-03-11
Payer: MEDICARE

## 2024-03-11 ENCOUNTER — RESULT CHARGE (OUTPATIENT)
Age: 69
End: 2024-03-11

## 2024-03-11 VITALS — WEIGHT: 140 LBS | BODY MASS INDEX: 24.8 KG/M2 | HEIGHT: 63 IN

## 2024-03-11 PROCEDURE — 99214 OFFICE O/P EST MOD 30 MIN: CPT | Mod: 25,57

## 2024-03-11 PROCEDURE — L1830: CPT | Mod: KV,KX,LT

## 2024-03-11 PROCEDURE — 73562 X-RAY EXAM OF KNEE 3: CPT | Mod: LT

## 2024-03-11 PROCEDURE — 27520 TREAT KNEECAP FRACTURE: CPT | Mod: LT

## 2024-03-12 ENCOUNTER — APPOINTMENT (OUTPATIENT)
Age: 69
End: 2024-03-12
Payer: MEDICARE

## 2024-03-12 PROCEDURE — 64483 NJX AA&/STRD TFRM EPI L/S 1: CPT | Mod: RT

## 2024-03-12 NOTE — DATA REVIEWED
[Report was reviewed and noted in the chart] : The report was reviewed and noted in the chart [MRI] : MRI [I reviewed the films/CD] : I reviewed the films/CD [I independently reviewed and interpreted images and report] : I independently reviewed and interpreted images and report [Left] : left [Knee] : knee [FreeTextEntry1] : MRI of the L knee revealed evidence of a non displaced lateral patella fracture  MRI of the L knee revealed evidence of a no displaced lateral patella fracture

## 2024-03-12 NOTE — HISTORY OF PRESENT ILLNESS
[de-identified] : Here for follow up on the left knee patella fracture. Has been having increased pain in the knee since last visit. Has been trying to keep it as still as possible.

## 2024-03-12 NOTE — DISCUSSION/SUMMARY
[Medication Risks Reviewed] : Medication risks reviewed [Surgical risks reviewed] : Surgical risks reviewed [de-identified] : X-Ray left knee reveals evidence of advanced bone on bone tricompartmental degenerative changes, advanced patellofemoral with associated spurs and calcifications lateral patella nondisplaced fracture.  MRI of the L knee revealed evidence of a non displaced lateral patella fracture   Advised the patient that considering non-displaced nature of fracture, likely should not require surgery however she is aware and understands that she is a candidate for total knee arthroplasty. Concern for healing of the non displaced lateral patella fx in the long term if she is considering a TKA  and the potential of resurfacing the knee cap.   The patient will begin use of immobilizer knee brace to ensure stability and avoid any recurrent instability/buckling events - fitted and dispensed in office today. discussed risks and benefits of surgery and surgical indications She will limit any dynamic activity and prolonged walking.   f/u 2 weeks     I, Dina Chen, attest that this documentation has been prepared under the direction and in the presence of Provider JUNE. Bhavya Spencer

## 2024-03-12 NOTE — PHYSICAL EXAM
[Left] : left knee [All Views] : anteroposterior, lateral, skyline, and anteroposterior standing [The fracture is in acceptable alignment. There is progression in healing seen] : The fracture is in acceptable alignment. There is progression in healing seen [] : no calf tenderness [FreeTextEntry8] : Lateral patella tenderness  [de-identified] : LIMITED BY PAIN [FreeTextEntry9] : .X-Ray left knee reveals evidence of advanced bone on bone tricompartmental degenerative changes, advanced patellofemoral with associated spurs and calcifications lateral patella nondisplaced fracture. [TWNoteComboBox7] : flexion 100 degrees [de-identified] : extension 5 degrees

## 2024-03-15 ENCOUNTER — TRANSCRIPTION ENCOUNTER (OUTPATIENT)
Age: 69
End: 2024-03-15

## 2024-03-19 ENCOUNTER — TRANSCRIPTION ENCOUNTER (OUTPATIENT)
Age: 69
End: 2024-03-19

## 2024-03-20 ENCOUNTER — RX RENEWAL (OUTPATIENT)
Age: 69
End: 2024-03-20

## 2024-03-20 RX ORDER — FENOFIBRATE 200 MG/1
200 CAPSULE ORAL
Qty: 90 | Refills: 2 | Status: ACTIVE | COMMUNITY
Start: 2021-05-16 | End: 1900-01-01

## 2024-03-25 ENCOUNTER — RESULT CHARGE (OUTPATIENT)
Age: 69
End: 2024-03-25

## 2024-03-25 ENCOUNTER — APPOINTMENT (OUTPATIENT)
Dept: ORTHOPEDIC SURGERY | Facility: CLINIC | Age: 69
End: 2024-03-25
Payer: MEDICARE

## 2024-03-25 DIAGNOSIS — S82.002A UNSPECIFIED FRACTURE OF LEFT PATELLA, INITIAL ENCOUNTER FOR CLOSED FRACTURE: ICD-10-CM

## 2024-03-25 DIAGNOSIS — E11.65 TYPE 2 DIABETES MELLITUS WITH HYPERGLYCEMIA: ICD-10-CM

## 2024-03-25 PROCEDURE — 73562 X-RAY EXAM OF KNEE 3: CPT | Mod: LT

## 2024-03-25 PROCEDURE — 99024 POSTOP FOLLOW-UP VISIT: CPT

## 2024-03-27 ENCOUNTER — APPOINTMENT (OUTPATIENT)
Dept: PAIN MANAGEMENT | Facility: CLINIC | Age: 69
End: 2024-03-27
Payer: MEDICARE

## 2024-03-27 VITALS — HEIGHT: 63 IN | WEIGHT: 140 LBS | BODY MASS INDEX: 24.8 KG/M2

## 2024-03-27 PROCEDURE — 99214 OFFICE O/P EST MOD 30 MIN: CPT

## 2024-03-27 NOTE — ASSESSMENT
[FreeTextEntry1] : 50% relief post BL TFESI with improved rom and adls   Pt. presents with 50% Relief of pain and improvement in ROM and ADLS post injection.  Able to sit, stand, walk, sleep for longer periods of time.

## 2024-03-27 NOTE — PHYSICAL EXAM
[de-identified] : PHYSICAL EXAM  Constitutional:  Appears well, no apparent distress Ability to communicate: Normal Respiratory: non-labored breathing Skin: no rash noted Head: normocephalic, atraumatic Neck: no visible thyroid enlargement Eyes: extraocular movements intact Neurologic: alert and oriented x3 Psychiatric: normal mood, affect, and behavior  Lumbar Spine:  Palpation: left and right lumbar paraspinal spasm and left and right lumbar paraspinal tenderness to palpation. ROM: Diminished range of motion in all plains.  Patient notes pain with lateral bending to the left and right. MMT: Motor exam is 5/5 through out bilateral lower extremities. Sensation: Light touch and pain is intact throughout bilateral lower extremities. Reflexes: achilles and patella reflexes are intact and  symmetrical.  No sustained clonus. Special Testing: Positive straight leg raise on the left and right side.  Assessemnt: Radiculopathy of lumbosacral region (M54.17) Myalgia (M79.10)

## 2024-03-27 NOTE — HISTORY OF PRESENT ILLNESS
[Lower back] : lower back [Radiating] : radiating [Constant] : constant [Tingling] : tingling [Leisure] : leisure [Household chores] : household chores [Sleep] : sleep [Meds] : meds [Injection therapy] : injection therapy [Walking] : walking [Lying in bed] : lying in bed [Standing] : standing [6] : 6 [5] : 5 [FreeTextEntry1] : B/L L4-5 TFESI- 3/12/2024 [] : no [FreeTextEntry6] : numbness [FreeTextEntry7] : left leg [de-identified] : driving

## 2024-03-27 NOTE — DISCUSSION/SUMMARY
[de-identified] : proceed BL L45 TFESI 3 months  After discussing various treatment options with the patient including but not limited to oral medications, physical therapy, exercise, modalities as well as interventional spinal injections, we have decided with the following plan: I personally reviewed the MRI/CT scan images and agree with the radiologist's report. The radiological findings were discussed with the patient. The risks, benefits, contents and alternatives to injection were explained in full to the patient. Risks outlined include but are not limited to infection,sepsis, bleeding, post-dural puncture headache, nerve damage, temporary increase in pain, syncopal episode, failure to resolve symptoms, allergic reaction, symptom recurrence, and elevation of blood sugar in diabetics. Cortisone may cause immunosuppression. Patient understands the risks. All questions were answered. After discussion of options, patient requested an injection. Information regarding the injection was given to the patient. Which medications to stop prior to the injection was explained to the patient as well. Follow up in 1-2 weeks post injection for re-evaluation. Continue Home exercises, stretching, activity modification, physical therapy, and conservative care. Patient is presenting with acute/sub-acute radicular pain with impairment in ADLs and functionality. The pain has not responded to conservative care including nsaid therapy and/or physical therapy. There is no bleeding tendency, unstable medical condition, or systemic infection.

## 2024-03-28 PROBLEM — S82.002A FRACTURE OF LEFT PATELLA: Status: ACTIVE | Noted: 2024-03-05

## 2024-03-28 PROBLEM — E11.65 UNCONTROLLED TYPE 2 DIABETES MELLITUS WITH HYPERGLYCEMIA: Status: ACTIVE | Noted: 2022-09-08

## 2024-03-28 NOTE — REVIEW OF SYSTEMS
[Negative] : Heme/Lymph [Fever] : no fever [Chest Pain] : no chest pain [Palpitations] : no palpitations [Shortness Of Breath] : no shortness of breath [Wheezing] : no wheezing [Abdominal Pain] : no abdominal pain [Dysuria] : no dysuria [Headache] : no headache [Memory Loss] : no memory loss

## 2024-03-28 NOTE — HISTORY OF PRESENT ILLNESS
[de-identified] : Patient is here for a follow up appointment for the left knee. Patient states pain has not improved since the previous visit. Patient notes pain is most prevalent at night. Patient was given a knee brace, but notes she doesn't wear it all the time.

## 2024-03-28 NOTE — PHYSICAL EXAM
[Left] : left knee [All Views] : anteroposterior, lateral, skyline, and anteroposterior standing [The fracture is in acceptable alignment. There is progression in healing seen] : The fracture is in acceptable alignment. There is progression in healing seen [] : no calf tenderness [FreeTextEntry8] : Lateral patella tenderness  [de-identified] : LIMITED BY PAIN [FreeTextEntry9] : .X-Ray left knee reveals  no evidence of  healing of a  lateral patella nondisplaced fracture. [TWNoteComboBox7] : flexion 100 degrees [de-identified] : extension 5 degrees

## 2024-03-28 NOTE — DISCUSSION/SUMMARY
[Medication Risks Reviewed] : Medication risks reviewed [Surgical risks reviewed] : Surgical risks reviewed [de-identified] : Pt has been non-compliant with use of prev dispensed immobilizer  brace  X-Ray left knee reveals  no evidence of  healing of a  lateral patella nondisplaced fracture.   Advised the patient that considering non-displaced nature of fracture, likely should not require surgery  however she is aware and understands that she is a candidate for total knee arthroplasty. Due to Concern for healing of the non displaced lateral patella fx in the long term if she is considering a TKA  and the potential of resurfacing the knee cap.   The patient will continue use of knee immobilizer brace to ensure stability and avoid any recurrent instability/buckling events Dispensed Reparel sleeve in office today for underneath her brace   Discussed proper activity modifications. She will limit any dynamic activity, bending, and prolonged walking.  ice for inflammation   f/u in 2 weeks     I, Dina Chen, attest that this documentation has been prepared under the direction and in the presence of Provider KENDRA Spencer

## 2024-04-02 ENCOUNTER — APPOINTMENT (OUTPATIENT)
Dept: ENDOCRINOLOGY | Facility: CLINIC | Age: 69
End: 2024-04-02
Payer: MEDICARE

## 2024-04-02 ENCOUNTER — RESULT CHARGE (OUTPATIENT)
Age: 69
End: 2024-04-02

## 2024-04-02 VITALS
SYSTOLIC BLOOD PRESSURE: 130 MMHG | WEIGHT: 140 LBS | BODY MASS INDEX: 24.8 KG/M2 | HEART RATE: 83 BPM | RESPIRATION RATE: 16 BRPM | OXYGEN SATURATION: 97 % | DIASTOLIC BLOOD PRESSURE: 80 MMHG | HEIGHT: 63 IN

## 2024-04-02 DIAGNOSIS — E83.52 HYPERCALCEMIA: ICD-10-CM

## 2024-04-02 DIAGNOSIS — E89.0 POSTPROCEDURAL HYPOTHYROIDISM: ICD-10-CM

## 2024-04-02 DIAGNOSIS — I10 ESSENTIAL (PRIMARY) HYPERTENSION: ICD-10-CM

## 2024-04-02 DIAGNOSIS — E78.5 HYPERLIPIDEMIA, UNSPECIFIED: ICD-10-CM

## 2024-04-02 LAB — GLUCOSE BLDC GLUCOMTR-MCNC: 175

## 2024-04-02 PROCEDURE — 99214 OFFICE O/P EST MOD 30 MIN: CPT

## 2024-04-02 PROCEDURE — G2211 COMPLEX E/M VISIT ADD ON: CPT

## 2024-04-02 NOTE — HISTORY OF PRESENT ILLNESS
[FreeTextEntry1] : 67 yo female f/u  for management of  DM2 and thyroid nodules.   *** Apr 02, 2024 ***  doing well, had to lower her Mounjaro dose b/o the shortage staying on  toujeo 48 un hs, actos 30 mg qd, Mounjaro 5 mg qw , crestor 5mg qd, fenofibrate 200 mg qd,  synthroid 88 mcg labs from 3/29/24- a1c- 6.3, TSH- 1.9, LDL- 84, neg urine microalb, ca- 10.2 reports fbs -   *** Jan 02, 2024 ***  feels more tired recently last labs from 12/5/23-  a1c- 5.9, WBC- 2.9, Hbg- 10.2, ca- 9.3, LDL- 82, 25D- 26, TSH- 0.55 plans to go back to hematologist soon getting monthly B12 injections taking  toujeo 48 un hs, actos 30 mg qd, Mounjaro 10 mg qw,crestor 5mg qd, fenofibrate 200 mg qd,  synthroid 88 mcg has not been wearing her sensor recently reports fbs - 120's- 130's denies hypo's  *** Sep 28, 2023 ***  feels fine tolerates mounjaro well, thinks it's been working better than trulicity. lost a few lbs on  toujeo 54 un hs,  actos 30 mg qd,  Mounjaro 7.5 mg qw labs from 9/25/23- a1c- 6.9, LDL- 99, TSH- 1.1 has been receiving steroids injections to the back q 3 months- sugars are high for a few days after wearing Naomie 2 Date of download:  09/28/2023  Diabetes Medications and Dosage: as above Indication for CGMS: verify a change in the treatment regimen, identify periods of hypoglycemia/ hyperglycemia.  Modal day report: pattern.  Pt with HYPO  0% of the time ( NONE below 54),  75% in target range Hyperglycemia:  25% elevation  Identified issues: carbohydrate counting dates analyzed: 08/18/2023- 08/31/2023  *** May 11, 2023 ***  taking Toujeo 74 un qd, actos 30 mg qd, Trulicity 1.5 mg qw resumed fenofibrate gained weight since stopping farxiga. sugars are more erratic  Date of download:  05/11/2023  Diabetes Medications and Dosage: as above Indication for CGMS: verify a change in the treatment regimen, identify periods of hypoglycemia/ hyperglycemia.  Modal day report: pattern.  Pt with HYPO  4% of the time ( NONE below 54),  57% in target range Hyperglycemia:  39% elevation  Identified issues: carbohydrate counting dates analyzed: 04/28/2023 - 05/11/2023  labs from 4/25/23- - a1c- 6.8, TSH- 1.26, TG- 413  *** Dec 07, 2022 ***  admitted with viral sepsis, cellulitis and shingles,  dehydration about 3 wks ago. apparently with DKA on admission. Stayed there for 4 days, on IV insulin, hydration discharged on prior regimen taking  toujeo 76 un hs, farxiga 10mg qd, actos 30 mg qd, Trulicity 1.5 gm qw labs from  11/29/22- bicarb- 24, ast/alt- 23/19, K- 4.3,  creat- 0.66, a1c- 6.6, f/amine- 254, ca- 10.5, iCa- 1.1 (0.9-1.3) labs from [facility] ( last day, at the discharge)- K- 3.3, co2- 29, neg ketones not checking FS- meter is broken. Prior was checking 4x day. wants to start sensors  *** Sep 08, 2022 ***  had covid in 05/22. still with a post-covid fatigue.  lost weight. new "wet" hot flashes  taking toujeo 76 un hs, farxiga 10mg qd,  actos 30 mg qd,  fenofibrate 200mg, crestor 5 mg resumed trulcity 1.5 mg qw PCP lowered  synthroid to 88 mcg last month not checking her FS  *** Jan 26, 2022 ***  feels ok. still with a back pain related to LS. getting steroid injections seeing another GI still no GES . stopped trulicity x 3 weeks to see if there is any help with her stomach issues, but there was no relief  on  toujeo 76 un hs, trulicity 1.5 qw, farxiga 10mg qd, fenofibrate 200mg, crestor 5 mg, synthroid 100 mcg labs done 2 days ago at PCP- results are pending per pt, was told a1c - 6.1, and TFT's were fine reports fbs and ppg-  low 100's denies hypo's   *** Aug 09, 2021 ***  on  toujeo 76 un hs, trulicity 1.5 qw, farxiga 10mg qd, fenofibrate 200mg, crestor 5 mg, synthroid 100 mcg labs from 8/2/21- a1c- 7.4, TG- 213, LDL- 57, 25D- 40, TSH- 0.53, ca- 10.0,  PTH- 113 reports DXA dome with her rheumatologist 2 months ago- reportedly stable, not on ART stopped checking FS still with stomach issues. did not do GES yet and did not try stopping Trulicity  yet  *** Apr 08, 2021 ***  on  toujeo 74 un hs, trulicity 1.5 qw, farxiga 10mg qd, fenofibrate 200mg, crestor 5 mg, synthroid 100 mcg reports fbs- under 120, ppg-not checking never proceeded with GES with worsening acid reflux. planning for EGD this month. otherwise, feels well  labs (4/2/21)- TSH- 1.0, TG- 215, LDL- 82, a1c- 6.7, ca- 9.6, PTH-80  *** Aug 18, 2020 ***  on  toujeo 74 un hs, trulicity 1.5 qw, farxiga 10mg qd, fenofibrate 200mg, synthroid 100 mcg feels more tired, poss related to the radiation tx. hot flashes on letrozole reports fbs- 142-162, not checking ppg she stopped white bread and fruits within past 2 weeks- FS are much better today fbs- 116 labs from 8/14/20- a1c- 7.3, TG- 196, LDL- 79, TSH- 2.08 WBC- 2.67 with low ANC  *** May 18, 2020 ***  diagnosed with breast cancer. S/p lumpectomy. planned for XRT and hormonal therapy On toujeo 70 un hs, trulicity 1.5 qw, farxiga 10mg qd, fenofibrate  134 mg, Synthroid 112 mcg not taking crestor reports fbs- 120's- 130's, ppg- 160's  *** Feb 11, 2020 ***  on toujeo 70 un hs, trulicity 1.5 qw, farxiga 10mg qd not taking crestor b/o generalized myalgia TSH- 1.14 TG-419 a1c- 6.4  no log, reports fbs- 140's, ppg- 160's today fbs in the office- 188 denies hypo's    *** Nov 13, 2019 *** recovering from cold. still on levaquin on toujeo 70 un hs, trulicity 1.5 qw, farxiga 10mg qd, synthroid 112 mcg, crestor 5mg qw (feels fine on this dose)  not taking zetia no GES yet a1c- 6.7, iCa- 5.6, TSH- 1.9, ca- 10.0 no Lipid panel done  *** Aug 13, 2019 ***  on  toujeo 70 un hs, trulicity 1.5 qw, farxiga 10mg qd,  synthroid  112 mcg, ramipril off HCTZ stopped crestor b/o myalgia. was rx'ed zetia off acarbose b/o GI issues did not do GES yet had an MRI abdomen done- reports normal results a1c- 6.3, f/am-272 TSH- 1.09, FT4- 1.3, LDL- 122, TG- 289 ca- 10.5 no log, reports FBS- low 110's, ppg- upto 149  *** May 29, 2019 ***  s/p total tx (9/25/18).  feels well. lost 3 lbs on  toujeo 70 un hs, trulicity 1.5 qw, farxiga 10mg qd,  acarbose 50mg with dinner (freq forgets to take it),  synthroid 112mcg - stopped fiasp b/o "stomach aches" off HCTZ b/o elevated calcium and noticed that BS is higher since then saw Dr. Crocker, had an EGD. still with bloating/ diarrhea. Per pt, symptoms started way prior to trulicity recalls 2 GES several yrs ago with equivocal results  TSH- 1.3 a1c- 6.4 <-- 6.6 <-- 8.6 <-- 8.2 f/amine-265 <--  258 g/queenie- 1.2  - ca/PTH/D- nl reports FBS-  138- 202 , p/D- 150's- 160's. no more nocturnal hypo's  FNA (7/20/18)- of LLP 2.6- benign follic nodule (Ramonita II) Thyr US (7/11/18)- multiple b/l nodules,incl dominant RMP 2.4, LMP 2.6 + 2.1, LLP 2.6.  All are stable  1mg ONDST- 1.6 TSH- 0.19, FT4-1.5 neg tpo/tg ab saw Dr Jalloh- s/p benign of Lt and isthmic nodules (no report is avail).   *** stopped cycloset  b/o  stomach cramps, HA, fatigue   Previously intolerant of metformin.  failed lantus, toujeo, basaglar. tried tresiba  HPI: Has been diagnosed with Hashimoto's thyroiditis and subclinical hyperthyroidism in 1998. Has been followed by Janet Hamm and [facility], but was never placed on antithyroidal medications.   Was also diagnosed with MNG, and underwent a biopsy of her dominant right thyroid nodule by Dr Jalloh, which was benign.  No history of neck surgery or radiation exposure to the neck area other than radiologic studies.  Family history is negative for thyroid illness.  Currently, there are no local neck symptoms of anterior neck pain or problems with breathing, speaking, or swallowing.  Patient denies symptoms of hypothyroidism or hyperthyroidism.  Also, with DM2 since 2003. Denies any complications recalls most recent a1c in the 8's range Lab review:   TSH- 0.25 <-- 0.083  FT4- 1.15  neg tpo/tg antibodies   Thyroid US (4/10/18)- large thyroid. Multiple bilateral nodules, including dominant RMP iso complex 2.5x1.5x3.2; LLP hypo 2.6x2.1x2.3; LMP isosolid 2.4x1.1x.2.1, and isthmic solid iso 1.1x0.6x1.2  24h I-123 thyroid uptake and scan (4/25/18)- "cold nodules in the LLP and thyroid isthmus". Uptake- 15.1%  FNA (11/07/17) of RMP 2.6cm nodule- colloid nodule   ********  last cardio - 2019 (Dr Stevens) last ST- 2019 last echo- 2019 last podiatry - several years ago last ophthalmology- 8/21

## 2024-04-02 NOTE — ASSESSMENT
[Diabetes Foot Care] : diabetes foot care [Long Term Vascular Complications] : long term vascular complications of diabetes [Carbohydrate Consistent Diet] : carbohydrate consistent diet [Importance of Diet and Exercise] : importance of diet and exercise to improve glycemic control, achieve weight loss and improve cardiovascular health [Exercise/Effect on Glucose] : exercise/effect on glucose [Hypoglycemia Management] : hypoglycemia management [Glucagon Use] : glucagon use [Ketone Testing] : ketone testing [Action and use of Insulin] : action and use of short and long-acting insulin [Self Monitoring of Blood Glucose] : self monitoring of blood glucose [Insulin Self-Administration] : insulin self-administration [Injection Technique, Storage, Sharps Disposal] : injection technique, storage, and sharps disposal [Sick-Day Management] : sick-day management [Retinopathy Screening] : Patient was referred to ophthalmology for retinopathy screening [Diabetic Medications] : Risks and benefits of diabetic medications were discussed [FreeTextEntry1] : 1. T2DM - pt prev declined retrying metformin at this time b/o fear GI problems, but I would wait now in view of recent DKA - also, keep off Farxiga for now. would avoid SGLT2i given her recent ketoacidosis. I'd like to see hospital records - switch to Naomie 3 once upgrades the phone - toujeo 48 un hs, actos 30 mg qd, - Mounjaro 5 mg qw and will uptitrate once higher doses are available - keep off acarbose, but advised on bolus insulin- will rx Lyumjev ; can take 4-5 un ac meals post injections - again advised that testing (GES) should be done off Mounjaro - crestor 5mg qd. might require combo with zetia - fenofibrate 200 mg qd  2. Post-surgical hypothyroidism - obtain path report from Dr. Jalloh - Synthroid 88 mcg - monitor ca/PTH. will do 24 h [facility] - f/u pulmonary (Dr. Lang) - advised to f/u with hem/onc re: neutropenia/ anemia  of note, advised to obtain the most recent DXA report from her rheumatologist (reportedly normal)  RTC 3 mos.

## 2024-04-04 ENCOUNTER — TRANSCRIPTION ENCOUNTER (OUTPATIENT)
Age: 69
End: 2024-04-04

## 2024-04-04 RX ORDER — TIRZEPATIDE 10 MG/.5ML
10 INJECTION, SOLUTION SUBCUTANEOUS
Qty: 3 | Refills: 2 | Status: ACTIVE | COMMUNITY
Start: 2023-05-11 | End: 1900-01-01

## 2024-04-17 ENCOUNTER — APPOINTMENT (OUTPATIENT)
Dept: ENDOCRINOLOGY | Facility: CLINIC | Age: 69
End: 2024-04-17
Payer: MEDICARE

## 2024-04-17 PROCEDURE — G0108 DIAB MANAGE TRN  PER INDIV: CPT

## 2024-04-29 ENCOUNTER — APPOINTMENT (OUTPATIENT)
Dept: ORTHOPEDIC SURGERY | Facility: CLINIC | Age: 69
End: 2024-04-29
Payer: MEDICARE

## 2024-04-29 PROCEDURE — G2211 COMPLEX E/M VISIT ADD ON: CPT

## 2024-04-29 PROCEDURE — 99214 OFFICE O/P EST MOD 30 MIN: CPT | Mod: 24

## 2024-04-29 PROCEDURE — 73562 X-RAY EXAM OF KNEE 3: CPT | Mod: LT

## 2024-04-30 ENCOUNTER — APPOINTMENT (OUTPATIENT)
Dept: MRI IMAGING | Facility: CLINIC | Age: 69
End: 2024-04-30
Payer: MEDICARE

## 2024-04-30 PROCEDURE — 73721 MRI JNT OF LWR EXTRE W/O DYE: CPT | Mod: RT,MH

## 2024-05-06 NOTE — HISTORY OF PRESENT ILLNESS
[de-identified] : Patient is here for a follow up appointment for the left knee. Patient states pain has improved since the previous visit. Patient notes pain is most prevalent with walking. Patient notes she no longer wears the knee brace, however using cane for ambulation

## 2024-05-06 NOTE — PHYSICAL EXAM
[Left] : left knee [All Views] : anteroposterior, lateral, skyline, and anteroposterior standing [The fracture is in acceptable alignment. There is progression in healing seen] : The fracture is in acceptable alignment. There is progression in healing seen [] : no calf tenderness [FreeTextEntry8] : Lateral patella tenderness  [de-identified] : LIMITED BY PAIN [FreeTextEntry9] : X-Ray left knee revealed evidence of a  lateral patella nondisplaced fracture in acceptable alignment  [TWNoteComboBox7] : flexion 100 degrees [de-identified] : extension 5 degrees

## 2024-05-06 NOTE — DISCUSSION/SUMMARY
[Medication Risks Reviewed] : Medication risks reviewed [Surgical risks reviewed] : Surgical risks reviewed [de-identified] : Pt has noted improvement in the L knee, using cane for ambulation purposes due to pain in both knees X-Ray left knee revealed evidence of a  lateral patella nondisplaced fracture in acceptable alignment   Advised the patient that considering non-displaced nature of fracture, likely should not require surgery  however she is aware and understands that she is a candidate for total knee arthroplasty. Due to concern for healing of the non displaced lateral patella fx in the long term if she is considering a TKA  and the potential of resurfacing the knee cap.  Rec quad strengthening  ice for inflammation  Prescribed patient Meloxicam 15mg daily and discussed risks of side effects, as well as timing/management of medication.  Side effects can include but are not limited to gastrointestinal ulcers and irritation, kidney failure, and bleeding issues. Use as directed and take with food to manage pain, inflammation, and discomfort.  In regard to the R knee... Pt has expressed some buckling and instability episodes of the R knee  Unclear if the symptoms or radicular from the back vs R knee TKA . Recommend further imaging of the knee to eval for loosening   Due to worsening pain and instability with mechanical symptoms, recommend the patient obtain MRI R knee  to rule out  loosening of hardware . Follow up after MRI to possibly rule out surgical pathology and discuss future treatment options.   f/u  3 weeks     I, Dina Chen, attest that this documentation has been prepared under the direction and in the presence of Provider KENDRA Spencer

## 2024-05-14 ENCOUNTER — APPOINTMENT (OUTPATIENT)
Dept: ORTHOPEDIC SURGERY | Facility: CLINIC | Age: 69
End: 2024-05-14
Payer: MEDICARE

## 2024-05-14 VITALS — WEIGHT: 140 LBS | HEIGHT: 63 IN | BODY MASS INDEX: 24.8 KG/M2

## 2024-05-14 DIAGNOSIS — M48.061 SPINAL STENOSIS, LUMBAR REGION WITHOUT NEUROGENIC CLAUDICATION: ICD-10-CM

## 2024-05-14 DIAGNOSIS — S46.011A STRAIN OF MUSCLE(S) AND TENDON(S) OF THE ROTATOR CUFF OF RIGHT SHOULDER, INITIAL ENCOUNTER: ICD-10-CM

## 2024-05-14 PROCEDURE — 99214 OFFICE O/P EST MOD 30 MIN: CPT

## 2024-05-21 PROBLEM — M48.061 LUMBAR STENOSIS WITHOUT NEUROGENIC CLAUDICATION: Status: ACTIVE | Noted: 2022-02-07

## 2024-05-21 PROBLEM — S46.011A TRAUMATIC INCOMPLETE TEAR OF RIGHT ROTATOR CUFF, INITIAL ENCOUNTER: Status: ACTIVE | Noted: 2023-05-04

## 2024-05-21 NOTE — PHYSICAL EXAM
[Left] : left knee [] : no calf tenderness [FreeTextEntry8] : Lateral patella tenderness  [FreeTextEntry9] : LIMITED BY PAIN  [de-identified] : LIMITED BY PAIN, weakness in hip flexion  [TWNoteComboBox7] : flexion 100 degrees [de-identified] : extension 5 degrees

## 2024-05-21 NOTE — DATA REVIEWED
[MRI] : MRI [Right] : of the right [Knee] : knee [Report was reviewed and noted in the chart] : The report was reviewed and noted in the chart [I independently reviewed and interpreted images and report] : I independently reviewed and interpreted images and report [I reviewed the films/CD] : I reviewed the films/CD [FreeTextEntry1] : MRI right knee reveals evidence of synovial plica with tendonitis

## 2024-05-21 NOTE — DISCUSSION/SUMMARY
[Medication Risks Reviewed] : Medication risks reviewed [Surgical risks reviewed] : Surgical risks reviewed [de-identified] : MRI right knee reveals evidence of synovial plica with tendonitis.   We reviewed the MRI findings and discussed their diagnosis and treatment options at length including the risks and benefits of both surgical and non-surgical options considering her knee doesn't have significant native pathology. Discussed risks of potential surgery however, a majority of her symptoms appear to be radicular in nature. She has been treating with epidural injections without relief. Typically we defer surgical intervention of lumbar spine given high risks however, she has now failed all conservative management. She is having persistent atrophy with radicular symptoms therefore, recommended she follow up with Dr. Polo to discuss surgery.   Recommended she avoid any deep squatting or heavy lifting  discussed shoulders and those are status carol, also a candidate for knee replacment on opposite and hip replacement but struggling from back issues which i think contribute to her knee pain and atrophy

## 2024-05-21 NOTE — HISTORY OF PRESENT ILLNESS
[de-identified] : Here for follow up on the MRI results of the right knee today. Had been called with the preliminary results from our in house staff. Still having the heaviness from the right knee to the foot. Left knee patella sleeve fracture is still uncomfortable but has been doing more.

## 2024-06-18 ENCOUNTER — APPOINTMENT (OUTPATIENT)
Dept: ORTHOPEDIC SURGERY | Facility: CLINIC | Age: 69
End: 2024-06-18

## 2024-06-18 VITALS — WEIGHT: 140 LBS | HEIGHT: 63 IN | BODY MASS INDEX: 24.8 KG/M2

## 2024-06-18 DIAGNOSIS — Z79.4 TYPE 2 DIABETES MELLITUS WITH OTHER DIABETIC NEUROLOGICAL COMPLICATION: ICD-10-CM

## 2024-06-18 DIAGNOSIS — K21.9 GASTRO-ESOPHAGEAL REFLUX DISEASE W/OUT ESOPHAGITIS: ICD-10-CM

## 2024-06-18 DIAGNOSIS — M23.91 UNSPECIFIED INTERNAL DERANGEMENT OF RIGHT KNEE: ICD-10-CM

## 2024-06-18 DIAGNOSIS — M23.92 UNSPECIFIED INTERNAL DERANGEMENT OF LEFT KNEE: ICD-10-CM

## 2024-06-18 DIAGNOSIS — M79.18 MYALGIA, OTHER SITE: ICD-10-CM

## 2024-06-18 DIAGNOSIS — Z96.651 PRESENCE OF RIGHT ARTIFICIAL KNEE JOINT: ICD-10-CM

## 2024-06-18 DIAGNOSIS — M54.17 RADICULOPATHY, LUMBOSACRAL REGION: ICD-10-CM

## 2024-06-18 DIAGNOSIS — M17.12 UNILATERAL PRIMARY OSTEOARTHRITIS, LEFT KNEE: ICD-10-CM

## 2024-06-18 DIAGNOSIS — E11.49 TYPE 2 DIABETES MELLITUS WITH OTHER DIABETIC NEUROLOGICAL COMPLICATION: ICD-10-CM

## 2024-06-18 PROCEDURE — 73564 X-RAY EXAM KNEE 4 OR MORE: CPT | Mod: LT

## 2024-06-18 PROCEDURE — 99214 OFFICE O/P EST MOD 30 MIN: CPT | Mod: 24,25

## 2024-06-18 PROCEDURE — 20611 DRAIN/INJ JOINT/BURSA W/US: CPT | Mod: LT

## 2024-06-27 ENCOUNTER — NON-APPOINTMENT (OUTPATIENT)
Age: 69
End: 2024-06-27

## 2024-06-27 PROBLEM — M23.91 INTERNAL DERANGEMENT OF RIGHT KNEE: Status: ACTIVE | Noted: 2023-01-18

## 2024-06-27 PROBLEM — M54.17 LUMBOSACRAL RADICULITIS: Status: ACTIVE | Noted: 2022-07-21

## 2024-06-27 PROBLEM — M79.18 MYALGIA, OTHER SITE: Status: ACTIVE | Noted: 2023-09-13

## 2024-06-27 PROBLEM — Z96.651 STATUS POST TOTAL RIGHT KNEE REPLACEMENT: Status: ACTIVE | Noted: 2022-04-26

## 2024-06-27 PROBLEM — K21.9 ESOPHAGEAL REFLUX: Status: ACTIVE | Noted: 2019-01-04

## 2024-06-27 PROBLEM — E11.49 CONTROLLED TYPE 2 DIABETES MELLITUS WITH OTHER NEUROLOGIC COMPLICATION, WITH LONG-TERM CURRENT USE OF INSULIN: Status: ACTIVE | Noted: 2024-04-02

## 2024-06-27 PROBLEM — M23.92 ACUTE INTERNAL DERANGEMENT OF LEFT KNEE: Status: ACTIVE | Noted: 2024-03-12

## 2024-06-27 NOTE — HISTORY OF PRESENT ILLNESS
[de-identified] : multiple issues, knee and back pain. Has been having some pain still at this time and is recently affecting her sleep. She will be going on a cruise and will need to walk a lot.

## 2024-06-27 NOTE — DISCUSSION/SUMMARY
[Medication Risks Reviewed] : Medication risks reviewed [Surgical risks reviewed] : Surgical risks reviewed [de-identified] : The patients condition is chronic.  Confounding medical conditions/concerns: Diabetes and hypertension , glaucoma Tests/Studies Independently Interpreted Today: X-Ray left knee reveals evidence of interval healing of patella sleeve fracture, advanced patellofemoral arthritis with associated bone spurs.   Discussed risks of surgical treatment and nonsurgical treatment of arthritis, discussed risks of steroid injection plus or minus Visco supplementation, risks of Zilretta and benefits, role of surgery and MRI, risks and role of NSAIDs and side effects, benefits of therapy. The patient is aware and understands that if he fails all conservative treatment modalities, he should consider a total knee arthroplasty. In the interim, we will focus on conservative management of arthritic related pain. She has previously done well with Zilretta injection in 10/2023 therefore, recommended she repeat for further relief.   Evaluated the patients LEFT KNEE and decided to proceed with Zilretta injections. Discussed future treatment options, will proceed, discussed possible surgery vs alternatives in future if symptoms worsen or persist despite injection.    The risks, benefits and contents of the injection have been discussed. The risks include but are not limited to allergic reaction, flare reaction, permanent white skin discoloration at the injection site and infection. The patient understands the risks and agrees to having the injection. All questions have been answered.   Follow up in 3 months

## 2024-06-27 NOTE — PROCEDURE
[FreeTextEntry3] : Procedure Note: Musculoskeletal Injection Procedure: Left knee Zilretta injection under US guidance    Indication:  The patient has had persistent pain despite conservative treatment.  Risks, benefits and alternatives to procedure were discussed; all questions were answered to the patient's apparent satisfaction and informed consent obtained.  The patient denied prior problems with local anesthetics, injectable cortisones, chicken allergy, coagulopathy and no relevant drug or preservative allergies or sensitivities.   The area of injection was prepared in a sterile fashion.  Prior to injection a 'Time Out' was conducted in accordance with [facility] policy and the site and nature of procedure verified with the patient.    Procedure: The procedure was carried out utilizing sterile technique from a superolateral arthroscopic portal position. The needle was placed under ultrasound guidance to improve accuracy and minimize risk to the patient and diagnostic ultrasound in the long and short axis revealed joint space narrowing    Ultrasound Indication: joint effusion    15cc of clear synovial fluid was aspirated.  The specimen:  (X) appeared benign and was discarded  ( ) was sent for Culture / Cell Count / Crystal analysis / [_].]    Injection into the target area with care taken to aspirate frequently to minimize the risk of intravascular injection was performed with:  (X) 5cc of 32mg Zilretta, prepared and diluted per  instructions    Patient tolerated the procedure well and direct pressure was applied for hemostasis. The patient was reminded of potential post-injection risks including, but not limited to, delayed hypersensitivity reactions and/or infection.  The patient verified that they had the office and the Emergency Room's contact information if any problems should arise.  After several minutes, the patient informed me that they felt fine and was released from the office.

## 2024-06-27 NOTE — PHYSICAL EXAM
[Left] : left knee [5___] : quadriceps 5[unfilled]/5 [Equivocal] : equivocal Yahaira [] : no calf tenderness [TWNoteComboBox7] : flexion 125 degrees [de-identified] : extension 3 degrees

## 2024-07-11 ENCOUNTER — RX RENEWAL (OUTPATIENT)
Age: 69
End: 2024-07-11

## 2024-07-11 RX ORDER — TIRZEPATIDE 7.5 MG/.5ML
7.5 INJECTION, SOLUTION SUBCUTANEOUS
Qty: 4 | Refills: 4 | Status: ACTIVE | COMMUNITY
Start: 2024-07-11 | End: 1900-01-01

## 2024-08-06 ENCOUNTER — APPOINTMENT (OUTPATIENT)
Dept: ENDOCRINOLOGY | Facility: CLINIC | Age: 69
End: 2024-08-06

## 2024-08-06 PROCEDURE — 99214 OFFICE O/P EST MOD 30 MIN: CPT

## 2024-08-06 PROCEDURE — G2211 COMPLEX E/M VISIT ADD ON: CPT

## 2024-08-06 NOTE — ASSESSMENT
[Diabetes Foot Care] : diabetes foot care [Long Term Vascular Complications] : long term vascular complications of diabetes [Carbohydrate Consistent Diet] : carbohydrate consistent diet [Importance of Diet and Exercise] : importance of diet and exercise to improve glycemic control, achieve weight loss and improve cardiovascular health [Exercise/Effect on Glucose] : exercise/effect on glucose [Hypoglycemia Management] : hypoglycemia management [Glucagon Use] : glucagon use [Ketone Testing] : ketone testing [Action and use of Insulin] : action and use of short and long-acting insulin [Self Monitoring of Blood Glucose] : self monitoring of blood glucose [Insulin Self-Administration] : insulin self-administration [Injection Technique, Storage, Sharps Disposal] : injection technique, storage, and sharps disposal [Sick-Day Management] : sick-day management [Retinopathy Screening] : Patient was referred to ophthalmology for retinopathy screening [Diabetic Medications] : Risks and benefits of diabetic medications were discussed [FreeTextEntry1] : 1. T2DM - pt prev declined retrying metformin at this time b/o fear GI problems, but I would wait now in view of recent DKA - also, keep off Farxiga for now. would avoid SGLT2i given her recent ketoacidosis. I'd like to see hospital records - switch to Naomie 3 once upgrades the phone - toujeo 46 un hs, actos 30 mg qd, - increase Mounjaro 12.5 mg qw and will uptitrate once higher doses are available - keep off acarbose, but advised on bolus insulin- will rx Lyumjev ; can take 4-5 un ac meals post injections - again advised that testing (GES) should be done off Mounjaro - crestor 5mg qd. might require combo with zetia - fenofibrate 200 mg qd  2. Post-surgical hypothyroidism - obtain path report from Dr. Jalloh - Synthroid 88 mcg 6 days a week - monitor ca/PTH. will do 24 h [facility] - f/u pulmonary (Dr. Lang) - advised to f/u with hem/onc re: neutropenia/ anemia  of note, advised to obtain the most recent DXA report from her rheumatologist (reportedly normal)  RTC 3 mos.

## 2024-08-06 NOTE — HISTORY OF PRESENT ILLNESS
[FreeTextEntry1] : 69 yo female f/u  for management of  DM2 and thyroid nodules.   *** Aug 06, 2024 ***  feels , but has been struggling with her BP recently had norvasc 5 mg bid added, inderal XL increased to 160 mg remains on toujeo 48 un hs, actos 30 mg qd,  Mounjaro 5 mg qw, crestor 5mg qd, fenofibrate 200 mg qd,  synthroid 88 mcg 6 days a week labs from 8/2/24- a1c- 6.0%, LDL- 90, 25D- 36.7, TSH- 1.99, K- 4.2, calcium- 10.4,  by report, fbs- 100-130, ppg upto 170  *** Apr 02, 2024 ***  doing well, had to lower her Mounjaro dose b/o the shortage staying on  toujeo 48 un hs, actos 30 mg qd, Mounjaro 5 mg qw , crestor 5mg qd, fenofibrate 200 mg qd,  synthroid 88 mcg labs from 3/29/24- a1c- 6.3, TSH- 1.9, LDL- 84, neg urine microalb, ca- 10.2 reports fbs -   *** Jan 02, 2024 ***  feels more tired recently last labs from 12/5/23-  a1c- 5.9, WBC- 2.9, Hbg- 10.2, ca- 9.3, LDL- 82, 25D- 26, TSH- 0.55 plans to go back to hematologist soon getting monthly B12 injections taking  toujeo 48 un hs, actos 30 mg qd, Mounjaro 10 mg qw,crestor 5mg qd, fenofibrate 200 mg qd,  synthroid 88 mcg has not been wearing her sensor recently reports fbs - 120's- 130's denies hypo's  *** Sep 28, 2023 ***  feels fine tolerates mounjaro well, thinks it's been working better than trulicity. lost a few lbs on  toujeo 54 un hs,  actos 30 mg qd,  Mounjaro 7.5 mg qw labs from 9/25/23- a1c- 6.9, LDL- 99, TSH- 1.1 has been receiving steroids injections to the back q 3 months- sugars are high for a few days after wearing Naomie 2 Date of download:  09/28/2023  Diabetes Medications and Dosage: as above Indication for CGMS: verify a change in the treatment regimen, identify periods of hypoglycemia/ hyperglycemia.  Modal day report: pattern.  Pt with HYPO  0% of the time ( NONE below 54),  75% in target range Hyperglycemia:  25% elevation  Identified issues: carbohydrate counting dates analyzed: 08/18/2023- 08/31/2023  *** May 11, 2023 ***  taking Toujeo 74 un qd, actos 30 mg qd, Trulicity 1.5 mg qw resumed fenofibrate gained weight since stopping farxiga. sugars are more erratic  Date of download:  05/11/2023  Diabetes Medications and Dosage: as above Indication for CGMS: verify a change in the treatment regimen, identify periods of hypoglycemia/ hyperglycemia.  Modal day report: pattern.  Pt with HYPO  4% of the time ( NONE below 54),  57% in target range Hyperglycemia:  39% elevation  Identified issues: carbohydrate counting dates analyzed: 04/28/2023 - 05/11/2023  labs from 4/25/23- - a1c- 6.8, TSH- 1.26, TG- 413  *** Dec 07, 2022 ***  admitted with viral sepsis, cellulitis and shingles,  dehydration about 3 wks ago. apparently with DKA on admission. Stayed there for 4 days, on IV insulin, hydration discharged on prior regimen taking  toujeo 76 un hs, farxiga 10mg qd, actos 30 mg qd, Trulicity 1.5 gm qw labs from  11/29/22- bicarb- 24, ast/alt- 23/19, K- 4.3,  creat- 0.66, a1c- 6.6, f/amine- 254, ca- 10.5, iCa- 1.1 (0.9-1.3) labs from [facility] ( last day, at the discharge)- K- 3.3, co2- 29, neg ketones not checking FS- meter is broken. Prior was checking 4x day. wants to start sensors  *** Sep 08, 2022 ***  had covid in 05/22. still with a post-covid fatigue.  lost weight. new "wet" hot flashes  taking toujeo 76 un hs, farxiga 10mg qd,  actos 30 mg qd,  fenofibrate 200mg, crestor 5 mg resumed trulcity 1.5 mg qw PCP lowered  synthroid to 88 mcg last month not checking her FS  *** Jan 26, 2022 ***  feels ok. still with a back pain related to LS. getting steroid injections seeing another GI still no GES . stopped trulicity x 3 weeks to see if there is any help with her stomach issues, but there was no relief  on  toujeo 76 un hs, trulicity 1.5 qw, farxiga 10mg qd, fenofibrate 200mg, crestor 5 mg, synthroid 100 mcg labs done 2 days ago at PCP- results are pending per pt, was told a1c - 6.1, and TFT's were fine reports fbs and ppg-  low 100's denies hypo's   *** Aug 09, 2021 ***  on  toujeo 76 un hs, trulicity 1.5 qw, farxiga 10mg qd, fenofibrate 200mg, crestor 5 mg, synthroid 100 mcg labs from 8/2/21- a1c- 7.4, TG- 213, LDL- 57, 25D- 40, TSH- 0.53, ca- 10.0,  PTH- 113 reports DXA dome with her rheumatologist 2 months ago- reportedly stable, not on ART stopped checking FS still with stomach issues. did not do GES yet and did not try stopping Trulicity  yet  *** Apr 08, 2021 ***  on  toujeo 74 un hs, trulicity 1.5 qw, farxiga 10mg qd, fenofibrate 200mg, crestor 5 mg, synthroid 100 mcg reports fbs- under 120, ppg-not checking never proceeded with GES with worsening acid reflux. planning for EGD this month. otherwise, feels well  labs (4/2/21)- TSH- 1.0, TG- 215, LDL- 82, a1c- 6.7, ca- 9.6, PTH-80  *** Aug 18, 2020 ***  on  toujeo 74 un hs, trulicity 1.5 qw, farxiga 10mg qd, fenofibrate 200mg, synthroid 100 mcg feels more tired, poss related to the radiation tx. hot flashes on letrozole reports fbs- 142-162, not checking ppg she stopped white bread and fruits within past 2 weeks- FS are much better today fbs- 116 labs from 8/14/20- a1c- 7.3, TG- 196, LDL- 79, TSH- 2.08 WBC- 2.67 with low ANC  *** May 18, 2020 ***  diagnosed with breast cancer. S/p lumpectomy. planned for XRT and hormonal therapy On toujeo 70 un hs, trulicity 1.5 qw, farxiga 10mg qd, fenofibrate  134 mg, Synthroid 112 mcg not taking crestor reports fbs- 120's- 130's, ppg- 160's  *** Feb 11, 2020 ***  on toujeo 70 un hs, trulicity 1.5 qw, farxiga 10mg qd not taking crestor b/o generalized myalgia TSH- 1.14 TG-419 a1c- 6.4  no log, reports fbs- 140's, ppg- 160's today fbs in the office- 188 denies hypo's    *** Nov 13, 2019 *** recovering from cold. still on levaquin on toujeo 70 un hs, trulicity 1.5 qw, farxiga 10mg qd, synthroid 112 mcg, crestor 5mg qw (feels fine on this dose)  not taking zetia no GES yet a1c- 6.7, iCa- 5.6, TSH- 1.9, ca- 10.0 no Lipid panel done  *** Aug 13, 2019 ***  on  toujeo 70 un hs, trulicity 1.5 qw, farxiga 10mg qd,  synthroid  112 mcg, ramipril off HCTZ stopped crestor b/o myalgia. was rx'ed zetia off acarbose b/o GI issues did not do GES yet had an MRI abdomen done- reports normal results a1c- 6.3, f/am-272 TSH- 1.09, FT4- 1.3, LDL- 122, TG- 289 ca- 10.5 no log, reports FBS- low 110's, ppg- upto 149  *** May 29, 2019 ***  s/p total tx (9/25/18).  feels well. lost 3 lbs on  toujeo 70 un hs, trulicity 1.5 qw, farxiga 10mg qd,  acarbose 50mg with dinner (freq forgets to take it),  synthroid 112mcg - stopped fiasp b/o "stomach aches" off HCTZ b/o elevated calcium and noticed that BS is higher since then saw Dr. Crocker, had an EGD. still with bloating/ diarrhea. Per pt, symptoms started way prior to trulicity recalls 2 GES several yrs ago with equivocal results  TSH- 1.3 a1c- 6.4 <-- 6.6 <-- 8.6 <-- 8.2 f/amine-265 <--  258 g/queenie- 1.2  - ca/PTH/D- nl reports FBS-  138- 202 , p/D- 150's- 160's. no more nocturnal hypo's  FNA (7/20/18)- of LLP 2.6- benign follic nodule (Ramonita II) Thyr US (7/11/18)- multiple b/l nodules,incl dominant RMP 2.4, LMP 2.6 + 2.1, LLP 2.6.  All are stable  1mg ONDST- 1.6 TSH- 0.19, FT4-1.5 neg tpo/tg ab saw Dr Jalloh- s/p benign of Lt and isthmic nodules (no report is avail).   *** stopped cycloset  b/o  stomach cramps, HA, fatigue   Previously intolerant of metformin.  failed lantus, toujeo, basaglar. tried tresiba  HPI: Has been diagnosed with Hashimoto's thyroiditis and subclinical hyperthyroidism in 1998. Has been followed by Janet Hamm and K[facility], but was never placed on antithyroidal medications.   Was also diagnosed with MNG, and underwent a biopsy of her dominant right thyroid nodule by Dr Jalloh, which was benign.  No history of neck surgery or radiation exposure to the neck area other than radiologic studies.  Family history is negative for thyroid illness.  Currently, there are no local neck symptoms of anterior neck pain or problems with breathing, speaking, or swallowing.  Patient denies symptoms of hypothyroidism or hyperthyroidism.  Also, with DM2 since 2003. Denies any complications recalls most recent a1c in the 8's range Lab review:   TSH- 0.25 <-- 0.083  FT4- 1.15  neg tpo/tg antibodies   Thyroid US (4/10/18)- large thyroid. Multiple bilateral nodules, including dominant RMP iso complex 2.5x1.5x3.2; LLP hypo 2.6x2.1x2.3; LMP isosolid 2.4x1.1x.2.1, and isthmic solid iso 1.1x0.6x1.2  24h I-123 thyroid uptake and scan (4/25/18)- "cold nodules in the LLP and thyroid isthmus". Uptake- 15.1%  FNA (11/07/17) of RMP 2.6cm nodule- colloid nodule   ********  last cardio - 2019 (Dr Stevens) last ST- 2019 last echo- 2019 last podiatry - several years ago last ophthalmology- 8/21

## 2024-08-07 ENCOUNTER — RX RENEWAL (OUTPATIENT)
Age: 69
End: 2024-08-07

## 2024-09-03 ENCOUNTER — APPOINTMENT (OUTPATIENT)
Dept: ORTHOPEDIC SURGERY | Facility: CLINIC | Age: 69
End: 2024-09-03

## 2024-10-04 ENCOUNTER — APPOINTMENT (OUTPATIENT)
Dept: PAIN MANAGEMENT | Facility: CLINIC | Age: 69
End: 2024-10-04

## 2024-10-14 ENCOUNTER — RX RENEWAL (OUTPATIENT)
Age: 69
End: 2024-10-14

## 2024-10-14 RX ORDER — TIRZEPATIDE 5 MG/.5ML
5 INJECTION, SOLUTION SUBCUTANEOUS
Qty: 3 | Refills: 1 | Status: ACTIVE | COMMUNITY
Start: 2024-10-14 | End: 1900-01-01

## 2024-11-05 ENCOUNTER — APPOINTMENT (OUTPATIENT)
Dept: ORTHOPEDIC SURGERY | Facility: CLINIC | Age: 69
End: 2024-11-05

## 2024-11-05 VITALS — WEIGHT: 140 LBS | BODY MASS INDEX: 24.8 KG/M2 | HEIGHT: 63 IN

## 2024-11-05 PROCEDURE — 73564 X-RAY EXAM KNEE 4 OR MORE: CPT | Mod: LT

## 2024-11-05 PROCEDURE — 99214 OFFICE O/P EST MOD 30 MIN: CPT | Mod: 25

## 2024-11-05 PROCEDURE — 20611 DRAIN/INJ JOINT/BURSA W/US: CPT | Mod: LT

## 2024-11-05 PROCEDURE — J3490M: CUSTOM | Mod: NC

## 2024-12-30 ENCOUNTER — APPOINTMENT (OUTPATIENT)
Dept: ENDOCRINOLOGY | Facility: CLINIC | Age: 69
End: 2024-12-30
Payer: MEDICARE

## 2024-12-30 VITALS
DIASTOLIC BLOOD PRESSURE: 74 MMHG | HEIGHT: 63 IN | SYSTOLIC BLOOD PRESSURE: 123 MMHG | BODY MASS INDEX: 24.98 KG/M2 | WEIGHT: 141 LBS | RESPIRATION RATE: 18 BRPM | OXYGEN SATURATION: 96 % | HEART RATE: 98 BPM

## 2024-12-30 DIAGNOSIS — Z79.4 TYPE 2 DIABETES MELLITUS WITH OTHER DIABETIC NEUROLOGICAL COMPLICATION: ICD-10-CM

## 2024-12-30 DIAGNOSIS — E89.0 POSTPROCEDURAL HYPOTHYROIDISM: ICD-10-CM

## 2024-12-30 DIAGNOSIS — I10 ESSENTIAL (PRIMARY) HYPERTENSION: ICD-10-CM

## 2024-12-30 DIAGNOSIS — E11.49 TYPE 2 DIABETES MELLITUS WITH OTHER DIABETIC NEUROLOGICAL COMPLICATION: ICD-10-CM

## 2024-12-30 DIAGNOSIS — E78.5 HYPERLIPIDEMIA, UNSPECIFIED: ICD-10-CM

## 2024-12-30 LAB — GLUCOSE BLDC GLUCOMTR-MCNC: 168

## 2024-12-30 PROCEDURE — 95251 CONT GLUC MNTR ANALYSIS I&R: CPT

## 2024-12-30 PROCEDURE — G2211 COMPLEX E/M VISIT ADD ON: CPT

## 2024-12-30 PROCEDURE — 99214 OFFICE O/P EST MOD 30 MIN: CPT

## 2025-01-07 ENCOUNTER — RX RENEWAL (OUTPATIENT)
Age: 70
End: 2025-01-07

## 2025-02-11 ENCOUNTER — APPOINTMENT (OUTPATIENT)
Dept: ORTHOPEDIC SURGERY | Facility: CLINIC | Age: 70
End: 2025-02-11
Payer: MEDICARE

## 2025-02-11 VITALS — HEIGHT: 63 IN | WEIGHT: 141 LBS | BODY MASS INDEX: 24.98 KG/M2

## 2025-02-11 DIAGNOSIS — M54.17 RADICULOPATHY, LUMBOSACRAL REGION: ICD-10-CM

## 2025-02-11 DIAGNOSIS — K21.9 GASTRO-ESOPHAGEAL REFLUX DISEASE W/OUT ESOPHAGITIS: ICD-10-CM

## 2025-02-11 DIAGNOSIS — M48.061 SPINAL STENOSIS, LUMBAR REGION WITHOUT NEUROGENIC CLAUDICATION: ICD-10-CM

## 2025-02-11 DIAGNOSIS — M23.91 UNSPECIFIED INTERNAL DERANGEMENT OF RIGHT KNEE: ICD-10-CM

## 2025-02-11 DIAGNOSIS — E11.65 TYPE 2 DIABETES MELLITUS WITH HYPERGLYCEMIA: ICD-10-CM

## 2025-02-11 DIAGNOSIS — M06.9 RHEUMATOID ARTHRITIS, UNSPECIFIED: ICD-10-CM

## 2025-02-11 DIAGNOSIS — M23.92 UNSPECIFIED INTERNAL DERANGEMENT OF LEFT KNEE: ICD-10-CM

## 2025-02-11 DIAGNOSIS — M17.12 UNILATERAL PRIMARY OSTEOARTHRITIS, LEFT KNEE: ICD-10-CM

## 2025-02-11 PROCEDURE — 20611 DRAIN/INJ JOINT/BURSA W/US: CPT | Mod: LT

## 2025-02-11 PROCEDURE — 99214 OFFICE O/P EST MOD 30 MIN: CPT | Mod: 25

## 2025-03-10 ENCOUNTER — TRANSCRIPTION ENCOUNTER (OUTPATIENT)
Age: 70
End: 2025-03-10

## 2025-03-18 LAB
25(OH)D3 SERPL-MCNC: 49.2 NG/ML
ALBUMIN SERPL ELPH-MCNC: 4.1 G/DL
ALP BLD-CCNC: 48 U/L
ALT SERPL-CCNC: 15 U/L
ANION GAP SERPL CALC-SCNC: 13 MMOL/L
AST SERPL-CCNC: 25 U/L
BILIRUB SERPL-MCNC: 0.5 MG/DL
BUN SERPL-MCNC: 17 MG/DL
CA-I SERPL-SCNC: 5.2 MG/DL
CALCIUM SERPL-MCNC: 9.7 MG/DL
CALCIUM SERPL-MCNC: 9.7 MG/DL
CHLORIDE SERPL-SCNC: 107 MMOL/L
CHOLEST SERPL-MCNC: 158 MG/DL
CO2 SERPL-SCNC: 23 MMOL/L
CREAT SERPL-MCNC: 0.71 MG/DL
EGFRCR SERPLBLD CKD-EPI 2021: 92 ML/MIN/1.73M2
ESTIMATED AVERAGE GLUCOSE: 137 MG/DL
FOLATE SERPL-MCNC: 5.5 NG/ML
GLUCOSE SERPL-MCNC: 94 MG/DL
HBA1C MFR BLD HPLC: 6.4 %
HDLC SERPL-MCNC: 24 MG/DL
LDLC SERPL CALC-MCNC: 98 MG/DL
NONHDLC SERPL-MCNC: 134 MG/DL
PARATHYROID HORMONE INTACT: 60 PG/ML
POTASSIUM SERPL-SCNC: 3.8 MMOL/L
PROT SERPL-MCNC: 6.1 G/DL
SODIUM SERPL-SCNC: 143 MMOL/L
T4 FREE SERPL-MCNC: 1.2 NG/DL
TRIGL SERPL-MCNC: 209 MG/DL
TSH SERPL-ACNC: 1.58 UIU/ML
VIT B12 SERPL-MCNC: 740 PG/ML

## 2025-03-20 ENCOUNTER — APPOINTMENT (OUTPATIENT)
Dept: ENDOCRINOLOGY | Facility: CLINIC | Age: 70
End: 2025-03-20
Payer: MEDICARE

## 2025-03-20 VITALS
WEIGHT: 137 LBS | BODY MASS INDEX: 24.27 KG/M2 | DIASTOLIC BLOOD PRESSURE: 81 MMHG | HEIGHT: 63 IN | RESPIRATION RATE: 16 BRPM | SYSTOLIC BLOOD PRESSURE: 147 MMHG | OXYGEN SATURATION: 96 % | HEART RATE: 86 BPM

## 2025-03-20 DIAGNOSIS — E78.5 HYPERLIPIDEMIA, UNSPECIFIED: ICD-10-CM

## 2025-03-20 DIAGNOSIS — Z79.4 TYPE 2 DIABETES MELLITUS WITH OTHER DIABETIC NEUROLOGICAL COMPLICATION: ICD-10-CM

## 2025-03-20 DIAGNOSIS — E11.49 TYPE 2 DIABETES MELLITUS WITH OTHER DIABETIC NEUROLOGICAL COMPLICATION: ICD-10-CM

## 2025-03-20 DIAGNOSIS — E83.52 HYPERCALCEMIA: ICD-10-CM

## 2025-03-20 DIAGNOSIS — E89.0 POSTPROCEDURAL HYPOTHYROIDISM: ICD-10-CM

## 2025-03-20 DIAGNOSIS — I10 ESSENTIAL (PRIMARY) HYPERTENSION: ICD-10-CM

## 2025-03-20 PROCEDURE — 99214 OFFICE O/P EST MOD 30 MIN: CPT

## 2025-03-20 PROCEDURE — G2211 COMPLEX E/M VISIT ADD ON: CPT

## 2025-03-26 ENCOUNTER — TRANSCRIPTION ENCOUNTER (OUTPATIENT)
Age: 70
End: 2025-03-26

## 2025-03-26 RX ORDER — EZETIMIBE 10 MG/1
10 TABLET ORAL DAILY
Qty: 90 | Refills: 2 | Status: ACTIVE | COMMUNITY
Start: 2025-03-26 | End: 1900-01-01

## 2025-03-31 ENCOUNTER — APPOINTMENT (OUTPATIENT)
Dept: ORTHOPEDIC SURGERY | Facility: CLINIC | Age: 70
End: 2025-03-31
Payer: MEDICARE

## 2025-03-31 VITALS — HEIGHT: 63 IN | BODY MASS INDEX: 24.27 KG/M2 | WEIGHT: 137 LBS

## 2025-03-31 DIAGNOSIS — M06.9 RHEUMATOID ARTHRITIS, UNSPECIFIED: ICD-10-CM

## 2025-03-31 PROCEDURE — J3490M: CUSTOM | Mod: NC

## 2025-03-31 PROCEDURE — 73564 X-RAY EXAM KNEE 4 OR MORE: CPT | Mod: LT

## 2025-03-31 PROCEDURE — 99214 OFFICE O/P EST MOD 30 MIN: CPT | Mod: 25

## 2025-03-31 PROCEDURE — 20611 DRAIN/INJ JOINT/BURSA W/US: CPT | Mod: LT

## 2025-04-01 ENCOUNTER — APPOINTMENT (OUTPATIENT)
Dept: MRI IMAGING | Facility: CLINIC | Age: 70
End: 2025-04-01
Payer: MEDICARE

## 2025-04-01 PROCEDURE — 73721 MRI JNT OF LWR EXTRE W/O DYE: CPT | Mod: LT

## 2025-04-07 ENCOUNTER — APPOINTMENT (OUTPATIENT)
Dept: ORTHOPEDIC SURGERY | Facility: CLINIC | Age: 70
End: 2025-04-07
Payer: MEDICARE

## 2025-04-07 VITALS — BODY MASS INDEX: 24.27 KG/M2 | WEIGHT: 137 LBS | HEIGHT: 63 IN

## 2025-04-07 DIAGNOSIS — S46.011A STRAIN OF MUSCLE(S) AND TENDON(S) OF THE ROTATOR CUFF OF RIGHT SHOULDER, INITIAL ENCOUNTER: ICD-10-CM

## 2025-04-07 DIAGNOSIS — M48.061 SPINAL STENOSIS, LUMBAR REGION WITHOUT NEUROGENIC CLAUDICATION: ICD-10-CM

## 2025-04-07 DIAGNOSIS — M23.91 UNSPECIFIED INTERNAL DERANGEMENT OF RIGHT KNEE: ICD-10-CM

## 2025-04-07 DIAGNOSIS — M54.17 RADICULOPATHY, LUMBOSACRAL REGION: ICD-10-CM

## 2025-04-07 PROCEDURE — 99214 OFFICE O/P EST MOD 30 MIN: CPT | Mod: 25

## 2025-04-07 PROCEDURE — 20610 DRAIN/INJ JOINT/BURSA W/O US: CPT | Mod: LT

## 2025-04-07 RX ORDER — METHYLPREDNISOLONE 4 MG/1
4 TABLET ORAL
Qty: 1 | Refills: 0 | Status: ACTIVE | COMMUNITY
Start: 2025-04-07 | End: 1900-01-01

## 2025-04-14 ENCOUNTER — APPOINTMENT (OUTPATIENT)
Dept: ORTHOPEDIC SURGERY | Facility: CLINIC | Age: 70
End: 2025-04-14
Payer: MEDICARE

## 2025-04-14 VITALS — WEIGHT: 137 LBS | BODY MASS INDEX: 24.27 KG/M2 | HEIGHT: 63 IN

## 2025-04-14 DIAGNOSIS — E11.65 TYPE 2 DIABETES MELLITUS WITH HYPERGLYCEMIA: ICD-10-CM

## 2025-04-14 DIAGNOSIS — M06.9 RHEUMATOID ARTHRITIS, UNSPECIFIED: ICD-10-CM

## 2025-04-14 PROCEDURE — 20611 DRAIN/INJ JOINT/BURSA W/US: CPT | Mod: LT

## 2025-04-14 PROCEDURE — 99213 OFFICE O/P EST LOW 20 MIN: CPT | Mod: 25

## 2025-04-21 ENCOUNTER — APPOINTMENT (OUTPATIENT)
Dept: ORTHOPEDIC SURGERY | Facility: CLINIC | Age: 70
End: 2025-04-21
Payer: MEDICARE

## 2025-04-21 VITALS — WEIGHT: 137 LBS | HEIGHT: 63 IN | BODY MASS INDEX: 24.27 KG/M2

## 2025-04-21 DIAGNOSIS — E11.49 TYPE 2 DIABETES MELLITUS WITH OTHER DIABETIC NEUROLOGICAL COMPLICATION: ICD-10-CM

## 2025-04-21 DIAGNOSIS — Z96.651 PRESENCE OF RIGHT ARTIFICIAL KNEE JOINT: ICD-10-CM

## 2025-04-21 DIAGNOSIS — M23.92 UNSPECIFIED INTERNAL DERANGEMENT OF LEFT KNEE: ICD-10-CM

## 2025-04-21 DIAGNOSIS — Z79.4 TYPE 2 DIABETES MELLITUS WITH OTHER DIABETIC NEUROLOGICAL COMPLICATION: ICD-10-CM

## 2025-04-21 DIAGNOSIS — M48.061 SPINAL STENOSIS, LUMBAR REGION WITHOUT NEUROGENIC CLAUDICATION: ICD-10-CM

## 2025-04-21 DIAGNOSIS — M17.12 UNILATERAL PRIMARY OSTEOARTHRITIS, LEFT KNEE: ICD-10-CM

## 2025-04-21 DIAGNOSIS — M23.91 UNSPECIFIED INTERNAL DERANGEMENT OF RIGHT KNEE: ICD-10-CM

## 2025-04-21 PROCEDURE — 20611 DRAIN/INJ JOINT/BURSA W/US: CPT | Mod: LT

## 2025-04-21 PROCEDURE — 99213 OFFICE O/P EST LOW 20 MIN: CPT | Mod: 25

## 2025-05-09 ENCOUNTER — RESULT REVIEW (OUTPATIENT)
Age: 70
End: 2025-05-09

## 2025-06-10 ENCOUNTER — TRANSCRIPTION ENCOUNTER (OUTPATIENT)
Age: 70
End: 2025-06-10

## 2025-06-18 ENCOUNTER — APPOINTMENT (OUTPATIENT)
Dept: ORTHOPEDIC SURGERY | Facility: CLINIC | Age: 70
End: 2025-06-18

## 2025-06-20 LAB
25(OH)D3 SERPL-MCNC: 39.1 NG/ML
ALBUMIN SERPL ELPH-MCNC: 4.1 G/DL
ALP BLD-CCNC: 59 U/L
ALT SERPL-CCNC: 16 U/L
ANION GAP SERPL CALC-SCNC: 15 MMOL/L
AST SERPL-CCNC: 32 U/L
BILIRUB SERPL-MCNC: 0.5 MG/DL
BUN SERPL-MCNC: 20 MG/DL
CALCIUM SERPL-MCNC: 9.7 MG/DL
CHLORIDE SERPL-SCNC: 107 MMOL/L
CHOLEST SERPL-MCNC: 102 MG/DL
CO2 SERPL-SCNC: 20 MMOL/L
CREAT SERPL-MCNC: 0.77 MG/DL
EGFRCR SERPLBLD CKD-EPI 2021: 83 ML/MIN/1.73M2
ESTIMATED AVERAGE GLUCOSE: 105 MG/DL
FOLATE SERPL-MCNC: 6.3 NG/ML
GLUCOSE SERPL-MCNC: 80 MG/DL
HBA1C MFR BLD HPLC: 5.3 %
HDLC SERPL-MCNC: 13 MG/DL
LDLC SERPL-MCNC: 47 MG/DL
NONHDLC SERPL-MCNC: 88 MG/DL
POTASSIUM SERPL-SCNC: 3.6 MMOL/L
POTASSIUM SERPL-SCNC: 3.8 MMOL/L
PROT SERPL-MCNC: 6.4 G/DL
SODIUM SERPL-SCNC: 142 MMOL/L
TRIGL SERPL-MCNC: 260 MG/DL
TSH SERPL-ACNC: 1.96 UIU/ML
VIT B12 SERPL-MCNC: 862 PG/ML

## 2025-06-21 ENCOUNTER — NON-APPOINTMENT (OUTPATIENT)
Age: 70
End: 2025-06-21

## 2025-06-23 ENCOUNTER — APPOINTMENT (OUTPATIENT)
Dept: ORTHOPEDIC SURGERY | Facility: CLINIC | Age: 70
End: 2025-06-23
Payer: MEDICARE

## 2025-06-23 ENCOUNTER — APPOINTMENT (OUTPATIENT)
Dept: ENDOCRINOLOGY | Facility: CLINIC | Age: 70
End: 2025-06-23
Payer: MEDICARE

## 2025-06-23 VITALS
DIASTOLIC BLOOD PRESSURE: 63 MMHG | BODY MASS INDEX: 23.57 KG/M2 | OXYGEN SATURATION: 96 % | HEART RATE: 82 BPM | RESPIRATION RATE: 16 BRPM | WEIGHT: 133 LBS | HEIGHT: 63 IN | SYSTOLIC BLOOD PRESSURE: 110 MMHG

## 2025-06-23 LAB — GLUCOSE BLDC GLUCOMTR-MCNC: 150

## 2025-06-23 PROCEDURE — 99214 OFFICE O/P EST MOD 30 MIN: CPT

## 2025-06-23 PROCEDURE — 20611 DRAIN/INJ JOINT/BURSA W/US: CPT | Mod: LT

## 2025-06-23 PROCEDURE — 99214 OFFICE O/P EST MOD 30 MIN: CPT | Mod: 25

## 2025-08-04 ENCOUNTER — APPOINTMENT (OUTPATIENT)
Dept: PAIN MANAGEMENT | Facility: CLINIC | Age: 70
End: 2025-08-04
Payer: MEDICARE

## 2025-08-04 VITALS — BODY MASS INDEX: 23.57 KG/M2 | WEIGHT: 133 LBS | HEIGHT: 63 IN

## 2025-08-04 DIAGNOSIS — M54.17 RADICULOPATHY, LUMBOSACRAL REGION: ICD-10-CM

## 2025-08-04 PROCEDURE — 99214 OFFICE O/P EST MOD 30 MIN: CPT

## 2025-08-08 ENCOUNTER — RX RENEWAL (OUTPATIENT)
Age: 70
End: 2025-08-08

## 2025-08-08 ENCOUNTER — TRANSCRIPTION ENCOUNTER (OUTPATIENT)
Age: 70
End: 2025-08-08

## 2025-08-08 ENCOUNTER — NON-APPOINTMENT (OUTPATIENT)
Age: 70
End: 2025-08-08

## 2025-08-15 LAB
ALBUMIN SERPL ELPH-MCNC: 3.7 G/DL
ALP BLD-CCNC: 47 U/L
ALT SERPL-CCNC: 18 U/L
ANION GAP SERPL CALC-SCNC: 13 MMOL/L
AST SERPL-CCNC: 33 U/L
BILIRUB SERPL-MCNC: 0.5 MG/DL
BUN SERPL-MCNC: 18 MG/DL
CALCIUM SERPL-MCNC: 9.6 MG/DL
CHLORIDE SERPL-SCNC: 109 MMOL/L
CHOLEST SERPL-MCNC: 87 MG/DL
CO2 SERPL-SCNC: 21 MMOL/L
CREAT SERPL-MCNC: 0.71 MG/DL
EGFRCR SERPLBLD CKD-EPI 2021: 91 ML/MIN/1.73M2
FOLATE SERPL-MCNC: 6.7 NG/ML
FRUCTOSAMINE SERPL-MCNC: 185 UMOL/L
GLUCOSE SERPL-MCNC: 91 MG/DL
HDLC SERPL-MCNC: 11 MG/DL
LDLC SERPL-MCNC: 37 MG/DL
NONHDLC SERPL-MCNC: 76 MG/DL
POTASSIUM SERPL-SCNC: 4.4 MMOL/L
PROT SERPL-MCNC: 6 G/DL
SODIUM SERPL-SCNC: 143 MMOL/L
TRIGL SERPL-MCNC: 242 MG/DL
TSH SERPL-ACNC: 0.43 UIU/ML
VIT B12 SERPL-MCNC: 643 PG/ML

## 2025-08-16 LAB
ESTIMATED AVERAGE GLUCOSE: 100 MG/DL
HBA1C MFR BLD HPLC: 5.1 %

## 2025-08-19 ENCOUNTER — APPOINTMENT (OUTPATIENT)
Dept: ENDOCRINOLOGY | Facility: CLINIC | Age: 70
End: 2025-08-19
Payer: MEDICARE

## 2025-08-19 ENCOUNTER — APPOINTMENT (OUTPATIENT)
Age: 70
End: 2025-08-19
Payer: MEDICARE

## 2025-08-19 VITALS
TEMPERATURE: 97.9 F | OXYGEN SATURATION: 98 % | HEIGHT: 63 IN | RESPIRATION RATE: 16 BRPM | BODY MASS INDEX: 23.39 KG/M2 | DIASTOLIC BLOOD PRESSURE: 55 MMHG | WEIGHT: 132 LBS | HEART RATE: 89 BPM | SYSTOLIC BLOOD PRESSURE: 122 MMHG

## 2025-08-19 DIAGNOSIS — Z79.4 TYPE 2 DIABETES MELLITUS WITH OTHER DIABETIC NEUROLOGICAL COMPLICATION: ICD-10-CM

## 2025-08-19 DIAGNOSIS — E78.5 HYPERLIPIDEMIA, UNSPECIFIED: ICD-10-CM

## 2025-08-19 DIAGNOSIS — E89.0 POSTPROCEDURAL HYPOTHYROIDISM: ICD-10-CM

## 2025-08-19 DIAGNOSIS — E11.49 TYPE 2 DIABETES MELLITUS WITH OTHER DIABETIC NEUROLOGICAL COMPLICATION: ICD-10-CM

## 2025-08-19 DIAGNOSIS — I10 ESSENTIAL (PRIMARY) HYPERTENSION: ICD-10-CM

## 2025-08-19 LAB — HBA1C MFR BLD HPLC: 5.2

## 2025-08-19 PROCEDURE — 64483 NJX AA&/STRD TFRM EPI L/S 1: CPT | Mod: 50

## 2025-08-19 PROCEDURE — 95251 CONT GLUC MNTR ANALYSIS I&R: CPT

## 2025-08-19 PROCEDURE — 83036 HEMOGLOBIN GLYCOSYLATED A1C: CPT | Mod: QW

## 2025-08-19 PROCEDURE — 99214 OFFICE O/P EST MOD 30 MIN: CPT | Mod: 25

## 2025-09-04 ENCOUNTER — APPOINTMENT (OUTPATIENT)
Dept: PAIN MANAGEMENT | Facility: CLINIC | Age: 70
End: 2025-09-04
Payer: MEDICARE

## 2025-09-04 ENCOUNTER — APPOINTMENT (OUTPATIENT)
Dept: PAIN MANAGEMENT | Facility: CLINIC | Age: 70
End: 2025-09-04

## 2025-09-04 DIAGNOSIS — M54.17 RADICULOPATHY, LUMBOSACRAL REGION: ICD-10-CM

## 2025-09-04 PROCEDURE — 99214 OFFICE O/P EST MOD 30 MIN: CPT

## 2025-09-15 ENCOUNTER — APPOINTMENT (OUTPATIENT)
Dept: CARDIOLOGY | Facility: CLINIC | Age: 70
End: 2025-09-15